# Patient Record
Sex: MALE | Race: WHITE | Employment: OTHER | URBAN - METROPOLITAN AREA
[De-identification: names, ages, dates, MRNs, and addresses within clinical notes are randomized per-mention and may not be internally consistent; named-entity substitution may affect disease eponyms.]

---

## 2017-04-12 ENCOUNTER — APPOINTMENT (OUTPATIENT)
Dept: LAB | Facility: HOSPITAL | Age: 72
End: 2017-04-12
Attending: UROLOGY
Payer: MEDICARE

## 2017-04-12 DIAGNOSIS — C61 CANCER OF PROSTATE (HCC): ICD-10-CM

## 2017-04-12 PROCEDURE — 36415 COLL VENOUS BLD VENIPUNCTURE: CPT

## 2017-04-12 PROCEDURE — 84153 ASSAY OF PSA TOTAL: CPT

## 2017-05-25 PROBLEM — M25.512 ACUTE PAIN OF LEFT SHOULDER: Status: ACTIVE | Noted: 2017-05-25

## 2017-05-25 PROBLEM — M25.562 ACUTE PAIN OF LEFT KNEE: Status: ACTIVE | Noted: 2017-05-25

## 2017-06-13 ENCOUNTER — CHARTING TRANS (OUTPATIENT)
Dept: OTHER | Age: 72
End: 2017-06-13

## 2017-06-13 ENCOUNTER — LAB SERVICES (OUTPATIENT)
Dept: OTHER | Age: 72
End: 2017-06-13

## 2017-06-13 LAB — RAPID STREP GROUP A: NORMAL

## 2017-08-29 PROBLEM — E11.40 DIABETIC NEUROPATHY ASSOCIATED WITH TYPE 2 DIABETES MELLITUS (HCC): Status: ACTIVE | Noted: 2017-08-29

## 2017-08-29 PROBLEM — M25.512 LEFT SHOULDER PAIN, UNSPECIFIED CHRONICITY: Status: ACTIVE | Noted: 2017-08-29

## 2017-08-29 PROBLEM — C61 PROSTATE CA (HCC): Status: ACTIVE | Noted: 2017-08-29

## 2017-08-29 PROBLEM — M75.122 COMPLETE TEAR OF LEFT ROTATOR CUFF: Status: ACTIVE | Noted: 2017-08-29

## 2017-08-29 PROBLEM — R32 URINARY LEAKAGE: Status: ACTIVE | Noted: 2017-08-29

## 2017-08-29 PROBLEM — M17.11 PRIMARY OSTEOARTHRITIS OF RIGHT KNEE: Status: ACTIVE | Noted: 2017-08-29

## 2017-08-29 PROBLEM — E11.9 DIABETES TYPE 2, CONTROLLED (HCC): Status: ACTIVE | Noted: 2017-08-29

## 2017-08-29 PROBLEM — B39.9 HISTOPLASMOSIS: Status: ACTIVE | Noted: 2017-08-29

## 2017-09-27 PROBLEM — S83.231A COMPLEX TEAR OF MEDIAL MENISCUS OF RIGHT KNEE AS CURRENT INJURY, INITIAL ENCOUNTER: Status: ACTIVE | Noted: 2017-09-27

## 2017-10-19 PROBLEM — Z47.89 ORTHOPEDIC AFTERCARE: Status: ACTIVE | Noted: 2017-10-19

## 2018-04-17 PROBLEM — Z96.651 STATUS POST RIGHT KNEE REPLACEMENT: Status: ACTIVE | Noted: 2018-04-17

## 2018-04-17 PROBLEM — R25.2 MUSCLE CRAMPING: Status: ACTIVE | Noted: 2018-04-17

## 2018-05-08 ENCOUNTER — LAB ENCOUNTER (OUTPATIENT)
Dept: LAB | Facility: HOSPITAL | Age: 73
End: 2018-05-08
Attending: UROLOGY
Payer: MEDICARE

## 2018-05-08 DIAGNOSIS — N13.9 OBSTRUCTION, UROPATHY: Primary | ICD-10-CM

## 2018-05-08 DIAGNOSIS — Z12.5 SCREENING PSA (PROSTATE SPECIFIC ANTIGEN): ICD-10-CM

## 2018-05-08 PROCEDURE — 82565 ASSAY OF CREATININE: CPT

## 2018-05-08 PROCEDURE — 36415 COLL VENOUS BLD VENIPUNCTURE: CPT

## 2018-10-02 ENCOUNTER — HOSPITAL ENCOUNTER (OUTPATIENT)
Dept: INTERVENTIONAL RADIOLOGY/VASCULAR | Facility: HOSPITAL | Age: 73
Discharge: HOME | End: 2018-10-02
Attending: INTERNAL MEDICINE | Admitting: INTERNAL MEDICINE
Payer: MEDICARE

## 2018-10-02 VITALS
OXYGEN SATURATION: 94 % | BODY MASS INDEX: 28 KG/M2 | HEART RATE: 56 BPM | SYSTOLIC BLOOD PRESSURE: 115 MMHG | WEIGHT: 171 LBS | DIASTOLIC BLOOD PRESSURE: 67 MMHG | RESPIRATION RATE: 13 BRPM

## 2018-10-02 DIAGNOSIS — R94.39 ABNORMAL STRESS TEST: ICD-10-CM

## 2018-10-02 PROCEDURE — 99152 MOD SED SAME PHYS/QHP 5/>YRS: CPT

## 2018-10-02 PROCEDURE — B2111ZZ FLUOROSCOPY OF MULTIPLE CORONARY ARTERIES USING LOW OSMOLAR CONTRAST: ICD-10-PCS | Performed by: INTERNAL MEDICINE

## 2018-10-02 PROCEDURE — 80048 BASIC METABOLIC PNL TOTAL CA: CPT | Performed by: INTERNAL MEDICINE

## 2018-10-02 PROCEDURE — 85027 COMPLETE CBC AUTOMATED: CPT | Performed by: INTERNAL MEDICINE

## 2018-10-02 PROCEDURE — 82962 GLUCOSE BLOOD TEST: CPT

## 2018-10-02 PROCEDURE — 93458 L HRT ARTERY/VENTRICLE ANGIO: CPT

## 2018-10-02 PROCEDURE — 99153 MOD SED SAME PHYS/QHP EA: CPT

## 2018-10-02 PROCEDURE — 4A023N7 MEASUREMENT OF CARDIAC SAMPLING AND PRESSURE, LEFT HEART, PERCUTANEOUS APPROACH: ICD-10-PCS | Performed by: INTERNAL MEDICINE

## 2018-10-02 RX ORDER — NITROGLYCERIN 20 MG/100ML
INJECTION INTRAVENOUS
Status: DISCONTINUED
Start: 2018-10-02 | End: 2018-10-02 | Stop reason: WASHOUT

## 2018-10-02 RX ORDER — VERAPAMIL HYDROCHLORIDE 2.5 MG/ML
INJECTION, SOLUTION INTRAVENOUS
Status: COMPLETED
Start: 2018-10-02 | End: 2018-10-02

## 2018-10-02 RX ORDER — MIDAZOLAM HYDROCHLORIDE 1 MG/ML
INJECTION INTRAMUSCULAR; INTRAVENOUS
Status: COMPLETED
Start: 2018-10-02 | End: 2018-10-02

## 2018-10-02 RX ORDER — SODIUM CHLORIDE 9 MG/ML
INJECTION, SOLUTION INTRAVENOUS
Status: DISCONTINUED
Start: 2018-10-02 | End: 2018-10-02

## 2018-10-02 RX ORDER — SODIUM CHLORIDE 9 MG/ML
INJECTION, SOLUTION INTRAVENOUS CONTINUOUS
Status: DISCONTINUED | OUTPATIENT
Start: 2018-10-02 | End: 2018-10-02

## 2018-10-02 RX ORDER — HEPARIN SODIUM 1000 [USP'U]/ML
INJECTION, SOLUTION INTRAVENOUS; SUBCUTANEOUS
Status: COMPLETED
Start: 2018-10-02 | End: 2018-10-02

## 2018-10-02 RX ORDER — LIDOCAINE HYDROCHLORIDE 20 MG/ML
INJECTION, SOLUTION EPIDURAL; INFILTRATION; INTRACAUDAL; PERINEURAL
Status: COMPLETED
Start: 2018-10-02 | End: 2018-10-02

## 2018-10-02 NOTE — BRIEF PROCEDURE NOTE
Brief Cardiac Cath Post-op Report    LM angiographically normal  LAD mild luminal irregularities  LCx large and dominant, angiographically normal  RCA small artery, nondominant, diffuse disease in the proximal segment    Plan:  Radial band deflation per pr

## 2018-11-03 VITALS
TEMPERATURE: 98.5 F | OXYGEN SATURATION: 98 % | HEART RATE: 76 BPM | BODY MASS INDEX: 29.23 KG/M2 | RESPIRATION RATE: 16 BRPM | WEIGHT: 181.88 LBS | HEIGHT: 66 IN

## 2019-05-10 ENCOUNTER — LAB ENCOUNTER (OUTPATIENT)
Dept: LAB | Facility: HOSPITAL | Age: 74
End: 2019-05-10
Attending: UROLOGY
Payer: MEDICARE

## 2019-05-10 ENCOUNTER — HOSPITAL ENCOUNTER (OUTPATIENT)
Dept: ULTRASOUND IMAGING | Facility: HOSPITAL | Age: 74
Discharge: HOME | End: 2019-05-10
Attending: UROLOGY
Payer: MEDICARE

## 2019-05-10 DIAGNOSIS — N13.9 ACUTE UNILATERAL OBSTRUCTIVE UROPATHY: ICD-10-CM

## 2019-05-10 DIAGNOSIS — N13.9 OBSTRUCTIVE UROPATHY: ICD-10-CM

## 2019-05-10 DIAGNOSIS — Z12.5 SPECIAL SCREENING FOR MALIGNANT NEOPLASM OF PROSTATE: Primary | ICD-10-CM

## 2019-05-10 PROCEDURE — 84153 ASSAY OF PSA TOTAL: CPT

## 2019-05-10 PROCEDURE — 36415 COLL VENOUS BLD VENIPUNCTURE: CPT

## 2019-05-10 PROCEDURE — 76857 US EXAM PELVIC LIMITED: CPT | Performed by: UROLOGY

## 2019-05-10 PROCEDURE — 82565 ASSAY OF CREATININE: CPT

## 2020-04-29 ENCOUNTER — APPOINTMENT (OUTPATIENT)
Dept: LAB | Facility: HOSPITAL | Age: 75
End: 2020-04-29
Attending: UROLOGY
Payer: MEDICARE

## 2020-04-29 DIAGNOSIS — N13.9 OBSTRUCTED, UROPATHY: ICD-10-CM

## 2020-04-29 DIAGNOSIS — C61 CANCER OF PROSTATE (HCC): ICD-10-CM

## 2020-04-29 PROCEDURE — 36415 COLL VENOUS BLD VENIPUNCTURE: CPT

## 2020-04-29 PROCEDURE — 82565 ASSAY OF CREATININE: CPT

## 2020-04-29 PROCEDURE — 84153 ASSAY OF PSA TOTAL: CPT

## 2021-06-07 ENCOUNTER — LAB ENCOUNTER (OUTPATIENT)
Dept: LAB | Facility: HOSPITAL | Age: 76
End: 2021-06-07
Attending: UROLOGY
Payer: MEDICARE

## 2021-06-07 DIAGNOSIS — C61 PROSTATE CANCER (HCC): ICD-10-CM

## 2021-06-07 DIAGNOSIS — N13.9 OBSTRUCTIVE UROPATHY: ICD-10-CM

## 2021-06-07 PROCEDURE — 36415 COLL VENOUS BLD VENIPUNCTURE: CPT

## 2021-06-07 PROCEDURE — 82565 ASSAY OF CREATININE: CPT

## 2021-06-07 PROCEDURE — 84153 ASSAY OF PSA TOTAL: CPT

## 2021-07-23 ENCOUNTER — TELEPHONE (OUTPATIENT)
Dept: NEUROLOGY | Facility: CLINIC | Age: 76
End: 2021-07-23

## 2021-12-20 PROBLEM — M12.811 RIGHT ROTATOR CUFF TEAR ARTHROPATHY: Status: ACTIVE | Noted: 2021-12-20

## 2021-12-20 PROBLEM — M75.101 RIGHT ROTATOR CUFF TEAR ARTHROPATHY: Status: ACTIVE | Noted: 2021-12-20

## 2022-09-15 ENCOUNTER — HOSPITAL ENCOUNTER (OUTPATIENT)
Dept: GENERAL RADIOLOGY | Facility: HOSPITAL | Age: 77
Discharge: HOME | End: 2022-09-15
Attending: ORTHOPAEDIC SURGERY
Payer: MEDICARE

## 2022-09-15 DIAGNOSIS — M25.512 LEFT SHOULDER PAIN, UNSPECIFIED CHRONICITY: ICD-10-CM

## 2022-09-15 PROCEDURE — 73030 X-RAY EXAM OF SHOULDER: CPT | Performed by: ORTHOPAEDIC SURGERY

## 2022-09-29 ENCOUNTER — LAB ENCOUNTER (OUTPATIENT)
Dept: LAB | Facility: HOSPITAL | Age: 77
End: 2022-09-29
Attending: PHYSICIAN ASSISTANT
Payer: MEDICARE

## 2022-09-29 ENCOUNTER — HOSPITAL ENCOUNTER (OUTPATIENT)
Dept: CT IMAGING | Facility: HOSPITAL | Age: 77
Discharge: HOME | End: 2022-09-29
Attending: PHYSICIAN ASSISTANT
Payer: MEDICARE

## 2022-09-29 DIAGNOSIS — G89.29 CHRONIC LEFT SHOULDER PAIN: Primary | ICD-10-CM

## 2022-09-29 DIAGNOSIS — M25.512 CHRONIC LEFT SHOULDER PAIN: Primary | ICD-10-CM

## 2022-09-29 DIAGNOSIS — M25.512 CHRONIC LEFT SHOULDER PAIN: ICD-10-CM

## 2022-09-29 DIAGNOSIS — G89.29 CHRONIC LEFT SHOULDER PAIN: ICD-10-CM

## 2022-09-29 LAB
BASOPHILS # BLD AUTO: 0.03 X10(3) UL (ref 0–0.2)
BASOPHILS NFR BLD AUTO: 0.6 %
CRP SERPL-MCNC: <0.29 MG/DL (ref ?–0.3)
DEPRECATED RDW RBC AUTO: 45.7 FL (ref 35.1–46.3)
EOSINOPHIL # BLD AUTO: 0.25 X10(3) UL (ref 0–0.7)
EOSINOPHIL NFR BLD AUTO: 4.6 %
ERYTHROCYTE [DISTWIDTH] IN BLOOD BY AUTOMATED COUNT: 13.6 % (ref 11–15)
ERYTHROCYTE [SEDIMENTATION RATE] IN BLOOD: 16 MM/HR
HCT VFR BLD AUTO: 47.8 %
HGB BLD-MCNC: 15.2 G/DL
IL6 SERPL-MCNC: <3 PG/ML (ref ?–4.4)
IMM GRANULOCYTES # BLD AUTO: 0.01 X10(3) UL (ref 0–1)
IMM GRANULOCYTES NFR BLD: 0.2 %
LYMPHOCYTES # BLD AUTO: 1.09 X10(3) UL (ref 1–4)
LYMPHOCYTES NFR BLD AUTO: 20.2 %
MCH RBC QN AUTO: 28.8 PG (ref 26–34)
MCHC RBC AUTO-ENTMCNC: 31.8 G/DL (ref 31–37)
MCV RBC AUTO: 90.5 FL
MONOCYTES # BLD AUTO: 0.43 X10(3) UL (ref 0.1–1)
MONOCYTES NFR BLD AUTO: 8 %
NEUTROPHILS # BLD AUTO: 3.59 X10 (3) UL (ref 1.5–7.7)
NEUTROPHILS # BLD AUTO: 3.59 X10(3) UL (ref 1.5–7.7)
NEUTROPHILS NFR BLD AUTO: 66.4 %
PLATELET # BLD AUTO: 199 10(3)UL (ref 150–450)
RBC # BLD AUTO: 5.28 X10(6)UL
WBC # BLD AUTO: 5.4 X10(3) UL (ref 4–11)

## 2022-09-29 PROCEDURE — 83529 ASAY OF INTERLEUKIN-6 (IL-6): CPT

## 2022-09-29 PROCEDURE — 73200 CT UPPER EXTREMITY W/O DYE: CPT | Performed by: PHYSICIAN ASSISTANT

## 2022-09-29 PROCEDURE — 86140 C-REACTIVE PROTEIN: CPT

## 2022-09-29 PROCEDURE — 85652 RBC SED RATE AUTOMATED: CPT

## 2022-09-29 PROCEDURE — 85025 COMPLETE CBC W/AUTO DIFF WBC: CPT

## 2022-09-29 PROCEDURE — 36415 COLL VENOUS BLD VENIPUNCTURE: CPT

## 2022-11-16 ENCOUNTER — HOSPITAL ENCOUNTER (OUTPATIENT)
Dept: GENERAL RADIOLOGY | Age: 77
Discharge: HOME | End: 2022-11-16
Attending: INTERNAL MEDICINE

## 2022-11-16 DIAGNOSIS — M81.0 OSTEOPOROSIS: ICD-10-CM

## 2022-11-16 PROCEDURE — 77080 DXA BONE DENSITY AXIAL: CPT

## 2023-01-05 NOTE — H&P
[facility]    PATIENT'S NAME: Goyo Crews   ATTENDING PHYSICIAN: Nilesh Butt MD   PATIENT ACCOUNT#:   [de-identified]    LOCATION:    MEDICAL RECORD #:   [id number]       YOB: 1945  ADMISSION DATE:       01/11/2023    HISTORY AND PHYSICAL EXAMINATION    HISTORY OF PRESENT ILLNESS:  The patient is a 74-year-old gentleman with left shoulder pain following a reverse total shoulder replacement by Dr. Elvia Prado in 2019. He had complaints of persistent pain and weakness. We ultimately did lab work, which was normal, to rule out low-grade infection and we did a CT scan to evaluate for component loosening. Review of the CT scan demonstrates inferior scapular notching, implant is well-fixed. Review of the operative note shows it is a [employer] system reverse. PAST MEDICAL HISTORY:  Significant for coronary artery disease, hypertension, histoplasmosis, sleep apnea, diabetes. PAST SURGICAL HISTORY:  Significant for right knee replacement, hernia repair, bilateral rotator cuff repairs, left reverse total shoulder replacement. MEDICATIONS:  Alfuzosin, gabapentin, isosorbide, valsartan, metformin, rosuvastatin, oxybutynin, nitroglycerin, atenolol, and a low-dose aspirin. ALLERGIES:  Amoxicillin, atorvastatin, and cholera vaccine. PHYSICAL EXAMINATION:    VITAL SIGNS:  He is 5 feet 6 inches, 170 pounds. EXTREMITIES:  Physical examination demonstrates a well-healed deltopectoral incision. His active elevation is to 110 with external rotation to 30, internal rotation to gluteus. No tenderness over the spine of his scapula. No anterior or posterior joint line tenderness. CT scan does demonstrate inferior scapular notching, but component appears well-fixed.     LABORATORY DATA:  Laboratory values are normal.    ASSESSMENT AND PLAN:  The patient is a 74-year-old gentleman with a painful left reverse total shoulder arthroplasty with CT scan that demonstrates inferior scapular notching. We discussed proceeding with revision of his reverse total shoulder arthroplasty to a lateralized component. Discussed all the risks and complications associated with this type of procedure and he would like to proceed.       Layla Draper PA-C, dictating for Jessica Simpson MD    Dictated By Jessica Simpson MD  d: 01/05/2023 07:01:40  t: 01/05/2023 07:17:59  [facility] [id number]  JACKSON/

## 2023-01-08 ENCOUNTER — LAB ENCOUNTER (OUTPATIENT)
Dept: LAB | Facility: HOSPITAL | Age: 78
End: 2023-01-08
Attending: ORTHOPAEDIC SURGERY
Payer: MEDICARE

## 2023-01-08 DIAGNOSIS — Z01.818 PREOP TESTING: ICD-10-CM

## 2023-01-08 LAB
MRSA DNA SPEC QL NAA+PROBE: NEGATIVE
SARS-COV-2 RNA RESP QL NAA+PROBE: NOT DETECTED

## 2023-01-08 PROCEDURE — 87641 MR-STAPH DNA AMP PROBE: CPT

## 2023-01-11 ENCOUNTER — HOSPITAL ENCOUNTER (OUTPATIENT)
Facility: HOSPITAL | Age: 78
Discharge: HOME | End: 2023-01-12
Attending: ORTHOPAEDIC SURGERY | Admitting: ORTHOPAEDIC SURGERY
Payer: MEDICARE

## 2023-01-11 ENCOUNTER — ANESTHESIA EVENT (OUTPATIENT)
Dept: SURGERY | Facility: HOSPITAL | Age: 78
End: 2023-01-11
Payer: MEDICARE

## 2023-01-11 ENCOUNTER — APPOINTMENT (OUTPATIENT)
Dept: GENERAL RADIOLOGY | Facility: HOSPITAL | Age: 78
End: 2023-01-11
Attending: ORTHOPAEDIC SURGERY
Payer: MEDICARE

## 2023-01-11 ENCOUNTER — ANESTHESIA (OUTPATIENT)
Dept: SURGERY | Facility: HOSPITAL | Age: 78
End: 2023-01-11
Payer: MEDICARE

## 2023-01-11 DIAGNOSIS — Z01.818 PREOP TESTING: Primary | ICD-10-CM

## 2023-01-11 PROBLEM — T84.028A: Status: ACTIVE | Noted: 2023-01-11

## 2023-01-11 PROBLEM — N40.0 BPH (BENIGN PROSTATIC HYPERPLASIA): Chronic | Status: ACTIVE | Noted: 2023-01-11

## 2023-01-11 PROBLEM — Z96.612: Status: ACTIVE | Noted: 2023-01-11

## 2023-01-11 PROBLEM — E11.9 DIABETES MELLITUS, TYPE 2 (HCC): Status: ACTIVE | Noted: 2017-08-29

## 2023-01-11 PROBLEM — I25.10 CAD (CORONARY ARTERY DISEASE): Chronic | Status: ACTIVE | Noted: 2023-01-11

## 2023-01-11 PROBLEM — T84.028A INSTABILITY OF REVERSE TOTAL LEFT SHOULDER ARTHROPLASTY (HCC): Status: ACTIVE | Noted: 2023-01-11

## 2023-01-11 PROBLEM — Z96.612 INSTABILITY OF REVERSE TOTAL LEFT SHOULDER ARTHROPLASTY (HCC): Status: ACTIVE | Noted: 2023-01-11

## 2023-01-11 PROBLEM — E78.5 HYPERLIPIDEMIA: Chronic | Status: ACTIVE | Noted: 2023-01-11

## 2023-01-11 PROBLEM — I10 ESSENTIAL HYPERTENSION: Chronic | Status: ACTIVE | Noted: 2023-01-11

## 2023-01-11 PROBLEM — G47.33 OSA (OBSTRUCTIVE SLEEP APNEA): Chronic | Status: ACTIVE | Noted: 2023-01-11

## 2023-01-11 LAB
GLUCOSE BLDC GLUCOMTR-MCNC: 103 MG/DL (ref 70–99)
GLUCOSE BLDC GLUCOMTR-MCNC: 138 MG/DL (ref 70–99)
GLUCOSE BLDC GLUCOMTR-MCNC: 158 MG/DL (ref 70–99)
GLUCOSE BLDC GLUCOMTR-MCNC: 287 MG/DL (ref 70–99)

## 2023-01-11 PROCEDURE — 0RRK00Z REPLACEMENT OF LEFT SHOULDER JOINT WITH REVERSE BALL AND SOCKET SYNTHETIC SUBSTITUTE, OPEN APPROACH: ICD-10-PCS | Performed by: ORTHOPAEDIC SURGERY

## 2023-01-11 PROCEDURE — 87070 CULTURE OTHR SPECIMN AEROBIC: CPT | Performed by: ORTHOPAEDIC SURGERY

## 2023-01-11 PROCEDURE — 73020 X-RAY EXAM OF SHOULDER: CPT | Performed by: ORTHOPAEDIC SURGERY

## 2023-01-11 PROCEDURE — 82962 GLUCOSE BLOOD TEST: CPT

## 2023-01-11 PROCEDURE — 0RPK0JZ REMOVAL OF SYNTHETIC SUBSTITUTE FROM LEFT SHOULDER JOINT, OPEN APPROACH: ICD-10-PCS | Performed by: ORTHOPAEDIC SURGERY

## 2023-01-11 PROCEDURE — 88300 SURGICAL PATH GROSS: CPT | Performed by: ORTHOPAEDIC SURGERY

## 2023-01-11 PROCEDURE — S0077 INJECTION, CLINDAMYCIN PHOSP: HCPCS | Performed by: STUDENT IN AN ORGANIZED HEALTH CARE EDUCATION/TRAINING PROGRAM

## 2023-01-11 PROCEDURE — 87205 SMEAR GRAM STAIN: CPT | Performed by: ORTHOPAEDIC SURGERY

## 2023-01-11 PROCEDURE — 87075 CULTR BACTERIA EXCEPT BLOOD: CPT | Performed by: ORTHOPAEDIC SURGERY

## 2023-01-11 PROCEDURE — 64415 NJX AA&/STRD BRCH PLXS IMG: CPT | Performed by: ORTHOPAEDIC SURGERY

## 2023-01-11 PROCEDURE — 87176 TISSUE HOMOGENIZATION CULTR: CPT | Performed by: ORTHOPAEDIC SURGERY

## 2023-01-11 DEVICE — IMPLANTABLE DEVICE
Type: IMPLANTABLE DEVICE | Site: SHOULDER | Status: FUNCTIONAL
Brand: COMPREHENSIVE® PROLONG®

## 2023-01-11 DEVICE — IMPLANTABLE DEVICE
Type: IMPLANTABLE DEVICE | Site: SHOULDER | Status: FUNCTIONAL
Brand: COMPREHENSIVE SHOULDER SYSTEM

## 2023-01-11 DEVICE — IMPLANTABLE DEVICE
Type: IMPLANTABLE DEVICE | Site: SHOULDER | Status: FUNCTIONAL
Brand: COMPREHENSIVE® VERSA-DIAL®

## 2023-01-11 RX ORDER — LOSARTAN POTASSIUM 100 MG/1
100 TABLET ORAL
Status: DISCONTINUED | OUTPATIENT
Start: 2023-01-11 | End: 2023-01-12

## 2023-01-11 RX ORDER — METOCLOPRAMIDE HYDROCHLORIDE 5 MG/ML
10 INJECTION INTRAMUSCULAR; INTRAVENOUS EVERY 8 HOURS PRN
Status: DISCONTINUED | OUTPATIENT
Start: 2023-01-11 | End: 2023-01-12

## 2023-01-11 RX ORDER — GABAPENTIN 300 MG/1
900 CAPSULE ORAL NIGHTLY
Status: DISCONTINUED | OUTPATIENT
Start: 2023-01-11 | End: 2023-01-11

## 2023-01-11 RX ORDER — MORPHINE SULFATE 4 MG/ML
4 INJECTION, SOLUTION INTRAMUSCULAR; INTRAVENOUS EVERY 10 MIN PRN
Status: DISCONTINUED | OUTPATIENT
Start: 2023-01-11 | End: 2023-01-11 | Stop reason: HOSPADM

## 2023-01-11 RX ORDER — HYDROMORPHONE HYDROCHLORIDE 1 MG/ML
0.4 INJECTION, SOLUTION INTRAMUSCULAR; INTRAVENOUS; SUBCUTANEOUS EVERY 5 MIN PRN
Status: DISCONTINUED | OUTPATIENT
Start: 2023-01-11 | End: 2023-01-11 | Stop reason: HOSPADM

## 2023-01-11 RX ORDER — MORPHINE SULFATE 10 MG/ML
6 INJECTION, SOLUTION INTRAMUSCULAR; INTRAVENOUS EVERY 10 MIN PRN
Status: DISCONTINUED | OUTPATIENT
Start: 2023-01-11 | End: 2023-01-11 | Stop reason: HOSPADM

## 2023-01-11 RX ORDER — ONDANSETRON 2 MG/ML
4 INJECTION INTRAMUSCULAR; INTRAVENOUS EVERY 6 HOURS PRN
Status: DISCONTINUED | OUTPATIENT
Start: 2023-01-11 | End: 2023-01-12

## 2023-01-11 RX ORDER — MORPHINE SULFATE 4 MG/ML
4 INJECTION, SOLUTION INTRAMUSCULAR; INTRAVENOUS EVERY 2 HOUR PRN
Status: DISCONTINUED | OUTPATIENT
Start: 2023-01-11 | End: 2023-01-12

## 2023-01-11 RX ORDER — ISOSORBIDE MONONITRATE 60 MG/1
60 TABLET, EXTENDED RELEASE ORAL DAILY
Status: DISCONTINUED | OUTPATIENT
Start: 2023-01-12 | End: 2023-01-12

## 2023-01-11 RX ORDER — MAGNESIUM HYDROXIDE 1200 MG/15ML
LIQUID ORAL CONTINUOUS PRN
Status: COMPLETED | OUTPATIENT
Start: 2023-01-11 | End: 2023-01-11

## 2023-01-11 RX ORDER — GLYCOPYRROLATE 0.2 MG/ML
INJECTION, SOLUTION INTRAMUSCULAR; INTRAVENOUS AS NEEDED
Status: DISCONTINUED | OUTPATIENT
Start: 2023-01-11 | End: 2023-01-11 | Stop reason: SURG

## 2023-01-11 RX ORDER — NICOTINE POLACRILEX 4 MG
30 LOZENGE BUCCAL
Status: DISCONTINUED | OUTPATIENT
Start: 2023-01-11 | End: 2023-01-11 | Stop reason: HOSPADM

## 2023-01-11 RX ORDER — NICOTINE POLACRILEX 4 MG
15 LOZENGE BUCCAL
Status: DISCONTINUED | OUTPATIENT
Start: 2023-01-11 | End: 2023-01-11 | Stop reason: HOSPADM

## 2023-01-11 RX ORDER — CLINDAMYCIN PHOSPHATE 900 MG/50ML
900 INJECTION INTRAVENOUS EVERY 8 HOURS
Status: COMPLETED | OUTPATIENT
Start: 2023-01-11 | End: 2023-01-12

## 2023-01-11 RX ORDER — ASPIRIN 81 MG/1
81 TABLET ORAL DAILY
Status: DISCONTINUED | OUTPATIENT
Start: 2023-01-11 | End: 2023-01-12

## 2023-01-11 RX ORDER — HYDROMORPHONE HYDROCHLORIDE 1 MG/ML
0.2 INJECTION, SOLUTION INTRAMUSCULAR; INTRAVENOUS; SUBCUTANEOUS EVERY 5 MIN PRN
Status: DISCONTINUED | OUTPATIENT
Start: 2023-01-11 | End: 2023-01-11 | Stop reason: HOSPADM

## 2023-01-11 RX ORDER — CLINDAMYCIN PHOSPHATE 900 MG/50ML
900 INJECTION INTRAVENOUS ONCE
Status: DISCONTINUED | OUTPATIENT
Start: 2023-01-11 | End: 2023-01-11 | Stop reason: HOSPADM

## 2023-01-11 RX ORDER — MORPHINE SULFATE 2 MG/ML
2 INJECTION, SOLUTION INTRAMUSCULAR; INTRAVENOUS EVERY 2 HOUR PRN
Status: DISCONTINUED | OUTPATIENT
Start: 2023-01-11 | End: 2023-01-12

## 2023-01-11 RX ORDER — ACETAMINOPHEN 500 MG
1000 TABLET ORAL EVERY 8 HOURS
Status: COMPLETED | OUTPATIENT
Start: 2023-01-11 | End: 2023-01-12

## 2023-01-11 RX ORDER — SODIUM PHOSPHATE, DIBASIC AND SODIUM PHOSPHATE, MONOBASIC 7; 19 G/133ML; G/133ML
1 ENEMA RECTAL ONCE AS NEEDED
Status: DISCONTINUED | OUTPATIENT
Start: 2023-01-11 | End: 2023-01-12

## 2023-01-11 RX ORDER — DEXTROSE MONOHYDRATE 25 G/50ML
50 INJECTION, SOLUTION INTRAVENOUS
Status: DISCONTINUED | OUTPATIENT
Start: 2023-01-11 | End: 2023-01-11 | Stop reason: HOSPADM

## 2023-01-11 RX ORDER — GABAPENTIN 600 MG/1
1200 TABLET ORAL
Status: DISCONTINUED | OUTPATIENT
Start: 2023-01-11 | End: 2023-01-11

## 2023-01-11 RX ORDER — HYDROCODONE BITARTRATE AND ACETAMINOPHEN 10; 325 MG/1; MG/1
2 TABLET ORAL EVERY 6 HOURS PRN
Status: DISCONTINUED | OUTPATIENT
Start: 2023-01-11 | End: 2023-01-12

## 2023-01-11 RX ORDER — LOSARTAN POTASSIUM 100 MG/1
100 TABLET ORAL DAILY
Status: DISCONTINUED | OUTPATIENT
Start: 2023-01-12 | End: 2023-01-11

## 2023-01-11 RX ORDER — ROSUVASTATIN CALCIUM 10 MG/1
20 TABLET, COATED ORAL NIGHTLY
Status: DISCONTINUED | OUTPATIENT
Start: 2023-01-11 | End: 2023-01-12

## 2023-01-11 RX ORDER — HYDROMORPHONE HYDROCHLORIDE 1 MG/ML
0.6 INJECTION, SOLUTION INTRAMUSCULAR; INTRAVENOUS; SUBCUTANEOUS EVERY 5 MIN PRN
Status: DISCONTINUED | OUTPATIENT
Start: 2023-01-11 | End: 2023-01-11 | Stop reason: HOSPADM

## 2023-01-11 RX ORDER — OXYCODONE HYDROCHLORIDE 5 MG/1
5 TABLET ORAL EVERY 4 HOURS PRN
Status: DISCONTINUED | OUTPATIENT
Start: 2023-01-11 | End: 2023-01-12

## 2023-01-11 RX ORDER — GABAPENTIN 600 MG/1
900 TABLET ORAL
Status: DISCONTINUED | OUTPATIENT
Start: 2023-01-12 | End: 2023-01-11 | Stop reason: ALTCHOICE

## 2023-01-11 RX ORDER — ATENOLOL 25 MG/1
25 TABLET ORAL
Status: DISCONTINUED | OUTPATIENT
Start: 2023-01-12 | End: 2023-01-11

## 2023-01-11 RX ORDER — SODIUM CHLORIDE, SODIUM LACTATE, POTASSIUM CHLORIDE, CALCIUM CHLORIDE 600; 310; 30; 20 MG/100ML; MG/100ML; MG/100ML; MG/100ML
INJECTION, SOLUTION INTRAVENOUS CONTINUOUS
Status: DISCONTINUED | OUTPATIENT
Start: 2023-01-11 | End: 2023-01-11 | Stop reason: HOSPADM

## 2023-01-11 RX ORDER — GABAPENTIN 600 MG/1
600 TABLET ORAL 2 TIMES DAILY
Status: DISCONTINUED | OUTPATIENT
Start: 2023-01-12 | End: 2023-01-12

## 2023-01-11 RX ORDER — OXYBUTYNIN CHLORIDE 5 MG/1
10 TABLET, EXTENDED RELEASE ORAL DAILY
Status: DISCONTINUED | OUTPATIENT
Start: 2023-01-12 | End: 2023-01-12

## 2023-01-11 RX ORDER — LIDOCAINE HYDROCHLORIDE 10 MG/ML
INJECTION, SOLUTION EPIDURAL; INFILTRATION; INTRACAUDAL; PERINEURAL AS NEEDED
Status: DISCONTINUED | OUTPATIENT
Start: 2023-01-11 | End: 2023-01-11 | Stop reason: SURG

## 2023-01-11 RX ORDER — MORPHINE SULFATE 4 MG/ML
6 INJECTION, SOLUTION INTRAMUSCULAR; INTRAVENOUS EVERY 2 HOUR PRN
Status: DISCONTINUED | OUTPATIENT
Start: 2023-01-11 | End: 2023-01-12

## 2023-01-11 RX ORDER — DOCUSATE SODIUM 100 MG/1
100 CAPSULE, LIQUID FILLED ORAL 2 TIMES DAILY
Status: DISCONTINUED | OUTPATIENT
Start: 2023-01-11 | End: 2023-01-12

## 2023-01-11 RX ORDER — SENNOSIDES 8.6 MG
17.2 TABLET ORAL NIGHTLY
Status: DISCONTINUED | OUTPATIENT
Start: 2023-01-11 | End: 2023-01-12

## 2023-01-11 RX ORDER — MORPHINE SULFATE 4 MG/ML
2 INJECTION, SOLUTION INTRAMUSCULAR; INTRAVENOUS EVERY 10 MIN PRN
Status: DISCONTINUED | OUTPATIENT
Start: 2023-01-11 | End: 2023-01-11 | Stop reason: HOSPADM

## 2023-01-11 RX ORDER — MORPHINE SULFATE 15 MG/1
15 TABLET ORAL EVERY 4 HOURS PRN
Status: DISCONTINUED | OUTPATIENT
Start: 2023-01-11 | End: 2023-01-12

## 2023-01-11 RX ORDER — DIPHENHYDRAMINE HYDROCHLORIDE 50 MG/ML
25 INJECTION INTRAMUSCULAR; INTRAVENOUS ONCE AS NEEDED
Status: ACTIVE | OUTPATIENT
Start: 2023-01-11 | End: 2023-01-11

## 2023-01-11 RX ORDER — NALOXONE HYDROCHLORIDE 0.4 MG/ML
80 INJECTION, SOLUTION INTRAMUSCULAR; INTRAVENOUS; SUBCUTANEOUS AS NEEDED
Status: DISCONTINUED | OUTPATIENT
Start: 2023-01-11 | End: 2023-01-11 | Stop reason: HOSPADM

## 2023-01-11 RX ORDER — MIDAZOLAM HYDROCHLORIDE 1 MG/ML
INJECTION INTRAMUSCULAR; INTRAVENOUS
Status: COMPLETED | OUTPATIENT
Start: 2023-01-11 | End: 2023-01-11

## 2023-01-11 RX ORDER — EPHEDRINE SULFATE 50 MG/ML
INJECTION, SOLUTION INTRAVENOUS AS NEEDED
Status: DISCONTINUED | OUTPATIENT
Start: 2023-01-11 | End: 2023-01-11 | Stop reason: SURG

## 2023-01-11 RX ORDER — NITROGLYCERIN 0.4 MG/1
0.4 TABLET SUBLINGUAL EVERY 5 MIN PRN
Status: DISCONTINUED | OUTPATIENT
Start: 2023-01-11 | End: 2023-01-12

## 2023-01-11 RX ORDER — DEXAMETHASONE SODIUM PHOSPHATE 4 MG/ML
VIAL (ML) INJECTION AS NEEDED
Status: DISCONTINUED | OUTPATIENT
Start: 2023-01-11 | End: 2023-01-11 | Stop reason: SURG

## 2023-01-11 RX ORDER — HYDROMORPHONE HYDROCHLORIDE 2 MG/1
2 TABLET ORAL EVERY 2 HOUR PRN
Status: DISCONTINUED | OUTPATIENT
Start: 2023-01-11 | End: 2023-01-12

## 2023-01-11 RX ORDER — LIDOCAINE HYDROCHLORIDE 10 MG/ML
INJECTION, SOLUTION INFILTRATION; PERINEURAL
Status: COMPLETED | OUTPATIENT
Start: 2023-01-11 | End: 2023-01-11

## 2023-01-11 RX ORDER — OXYCODONE HYDROCHLORIDE 5 MG/1
10 TABLET ORAL EVERY 4 HOURS PRN
Status: DISCONTINUED | OUTPATIENT
Start: 2023-01-11 | End: 2023-01-12

## 2023-01-11 RX ORDER — GABAPENTIN 400 MG/1
1200 CAPSULE ORAL NIGHTLY
Status: DISCONTINUED | OUTPATIENT
Start: 2023-01-11 | End: 2023-01-11

## 2023-01-11 RX ORDER — ROPIVACAINE HYDROCHLORIDE 5 MG/ML
INJECTION, SOLUTION EPIDURAL; INFILTRATION; PERINEURAL
Status: COMPLETED | OUTPATIENT
Start: 2023-01-11 | End: 2023-01-11

## 2023-01-11 RX ORDER — ACETAMINOPHEN 500 MG
1000 TABLET ORAL ONCE
Status: COMPLETED | OUTPATIENT
Start: 2023-01-11 | End: 2023-01-11

## 2023-01-11 RX ORDER — TRANEXAMIC ACID 10 MG/ML
INJECTION, SOLUTION INTRAVENOUS AS NEEDED
Status: DISCONTINUED | OUTPATIENT
Start: 2023-01-11 | End: 2023-01-11 | Stop reason: SURG

## 2023-01-11 RX ORDER — HYDROMORPHONE HYDROCHLORIDE 4 MG/1
4 TABLET ORAL EVERY 4 HOURS PRN
Status: DISCONTINUED | OUTPATIENT
Start: 2023-01-11 | End: 2023-01-12

## 2023-01-11 RX ORDER — HYDROCODONE BITARTRATE AND ACETAMINOPHEN 10; 325 MG/1; MG/1
1 TABLET ORAL EVERY 6 HOURS PRN
Status: DISCONTINUED | OUTPATIENT
Start: 2023-01-11 | End: 2023-01-12

## 2023-01-11 RX ORDER — SODIUM CHLORIDE, SODIUM LACTATE, POTASSIUM CHLORIDE, CALCIUM CHLORIDE 600; 310; 30; 20 MG/100ML; MG/100ML; MG/100ML; MG/100ML
INJECTION, SOLUTION INTRAVENOUS CONTINUOUS
Status: DISCONTINUED | OUTPATIENT
Start: 2023-01-11 | End: 2023-01-12

## 2023-01-11 RX ORDER — ONDANSETRON 2 MG/ML
INJECTION INTRAMUSCULAR; INTRAVENOUS AS NEEDED
Status: DISCONTINUED | OUTPATIENT
Start: 2023-01-11 | End: 2023-01-11 | Stop reason: SURG

## 2023-01-11 RX ORDER — PHENYLEPHRINE HCL 10 MG/ML
VIAL (ML) INJECTION AS NEEDED
Status: DISCONTINUED | OUTPATIENT
Start: 2023-01-11 | End: 2023-01-11 | Stop reason: SURG

## 2023-01-11 RX ORDER — CLINDAMYCIN PHOSPHATE 150 MG/ML
INJECTION, SOLUTION INTRAVENOUS AS NEEDED
Status: DISCONTINUED | OUTPATIENT
Start: 2023-01-11 | End: 2023-01-11 | Stop reason: SURG

## 2023-01-11 RX ORDER — DEXAMETHASONE SODIUM PHOSPHATE 10 MG/ML
INJECTION, SOLUTION INTRAMUSCULAR; INTRAVENOUS
Status: COMPLETED | OUTPATIENT
Start: 2023-01-11 | End: 2023-01-11

## 2023-01-11 RX ORDER — LIDOCAINE HYDROCHLORIDE 40 MG/ML
SOLUTION TOPICAL AS NEEDED
Status: DISCONTINUED | OUTPATIENT
Start: 2023-01-11 | End: 2023-01-11 | Stop reason: SURG

## 2023-01-11 RX ORDER — BISACODYL 10 MG
10 SUPPOSITORY, RECTAL RECTAL
Status: DISCONTINUED | OUTPATIENT
Start: 2023-01-11 | End: 2023-01-12

## 2023-01-11 RX ORDER — TAMSULOSIN HYDROCHLORIDE 0.4 MG/1
0.4 CAPSULE ORAL NIGHTLY
Status: DISCONTINUED | OUTPATIENT
Start: 2023-01-11 | End: 2023-01-12

## 2023-01-11 RX ORDER — GABAPENTIN 300 MG/1
900 CAPSULE ORAL 2 TIMES DAILY
Status: DISCONTINUED | OUTPATIENT
Start: 2023-01-12 | End: 2023-01-11

## 2023-01-11 RX ORDER — ATENOLOL 25 MG/1
25 TABLET ORAL NIGHTLY
Status: DISCONTINUED | OUTPATIENT
Start: 2023-01-11 | End: 2023-01-12

## 2023-01-11 RX ORDER — POLYETHYLENE GLYCOL 3350 17 G/17G
17 POWDER, FOR SOLUTION ORAL DAILY PRN
Status: DISCONTINUED | OUTPATIENT
Start: 2023-01-11 | End: 2023-01-12

## 2023-01-11 RX ORDER — GABAPENTIN 300 MG/1
900 CAPSULE ORAL NIGHTLY
Status: DISCONTINUED | OUTPATIENT
Start: 2023-01-11 | End: 2023-01-12

## 2023-01-11 RX ADMIN — ROPIVACAINE HYDROCHLORIDE 30 ML: 5 INJECTION, SOLUTION EPIDURAL; INFILTRATION; PERINEURAL at 11:40:00

## 2023-01-11 RX ADMIN — PHENYLEPHRINE HCL 100 MCG: 10 MG/ML VIAL (ML) INJECTION at 12:41:00

## 2023-01-11 RX ADMIN — DEXAMETHASONE SODIUM PHOSPHATE 4 MG: 4 MG/ML VIAL (ML) INJECTION at 11:55:00

## 2023-01-11 RX ADMIN — PHENYLEPHRINE HCL 100 MCG: 10 MG/ML VIAL (ML) INJECTION at 12:16:00

## 2023-01-11 RX ADMIN — ONDANSETRON 4 MG: 2 INJECTION INTRAMUSCULAR; INTRAVENOUS at 11:55:00

## 2023-01-11 RX ADMIN — DEXAMETHASONE SODIUM PHOSPHATE 4 MG: 10 INJECTION, SOLUTION INTRAMUSCULAR; INTRAVENOUS at 11:40:00

## 2023-01-11 RX ADMIN — GLYCOPYRROLATE 0.2 MG: 0.2 INJECTION, SOLUTION INTRAMUSCULAR; INTRAVENOUS at 11:53:00

## 2023-01-11 RX ADMIN — MIDAZOLAM HYDROCHLORIDE 2 MG: 1 INJECTION INTRAMUSCULAR; INTRAVENOUS at 11:40:00

## 2023-01-11 RX ADMIN — LIDOCAINE HYDROCHLORIDE 5 ML: 10 INJECTION, SOLUTION INFILTRATION; PERINEURAL at 11:40:00

## 2023-01-11 RX ADMIN — LIDOCAINE HYDROCHLORIDE 4 ML: 40 SOLUTION TOPICAL at 11:57:00

## 2023-01-11 RX ADMIN — EPHEDRINE SULFATE 5 MG: 50 INJECTION, SOLUTION INTRAVENOUS at 12:18:00

## 2023-01-11 RX ADMIN — TRANEXAMIC ACID 1000 MG: 10 INJECTION, SOLUTION INTRAVENOUS at 12:05:00

## 2023-01-11 RX ADMIN — PHENYLEPHRINE HCL 100 MCG: 10 MG/ML VIAL (ML) INJECTION at 12:10:00

## 2023-01-11 RX ADMIN — PHENYLEPHRINE HCL 100 MCG: 10 MG/ML VIAL (ML) INJECTION at 12:05:00

## 2023-01-11 RX ADMIN — LIDOCAINE HYDROCHLORIDE 50 MG: 10 INJECTION, SOLUTION EPIDURAL; INFILTRATION; INTRACAUDAL; PERINEURAL at 11:53:00

## 2023-01-11 RX ADMIN — CLINDAMYCIN PHOSPHATE 900 MG: 150 INJECTION, SOLUTION INTRAVENOUS at 12:00:00

## 2023-01-11 RX ADMIN — SODIUM CHLORIDE, SODIUM LACTATE, POTASSIUM CHLORIDE, CALCIUM CHLORIDE: 600; 310; 30; 20 INJECTION, SOLUTION INTRAVENOUS at 11:50:00

## 2023-01-11 NOTE — ANESTHESIA PROCEDURE NOTES
Airway  Date/Time: 1/11/2023 11:57 AM  Urgency: elective      General Information and Staff    Patient location during procedure: OR  Anesthesiologist: Scar Spence MD  Performed: anesthesiologist     Indications and Patient Condition  Indications for airway management: anesthesia  Sedation level: deep  Preoxygenated: yes  Patient position: sniffing  Mask difficulty assessment: 3 - difficult mask (inadequate, unstable or two providers) +/- NMBA    Final Airway Details  Final airway type: endotracheal airway      Successful airway: ETT  Cuffed: yes   Successful intubation technique: Video laryngoscopy  Endotracheal tube insertion site: oral  Blade: GlideScope  Blade size: #3  ETT size (mm): 7.0    Cormack-Lehane Classification: grade I - full view of glottis  Placement verified by: capnometry   Measured from: lips  ETT to lips (cm): 21  Number of attempts at approach: 1    Additional Comments  Atraumatic x1, edentulous

## 2023-01-11 NOTE — BRIEF OP NOTE
Pre-Operative Diagnosis: left shoulder pain     Post-Operative Diagnosis: left shoulder pain      Procedure Performed:   left shoulder revision reverse arthroplasty    Surgeon(s) and Role:     January Burns MD - Primary    Assistant(s):  Surgical Assistant.: Peterson Whitman CSA  PA: NOREEN Osborne MD    Surgical Findings: same     Specimen: tissue cultures     Estimated Blood Loss: No data recorded      HODAN RAIN PA  1/11/2023  1:15 PM

## 2023-01-11 NOTE — ANESTHESIA PROCEDURE NOTES
Peripheral Block    Date/Time: 1/11/2023 11:40 AM  Performed by: Marlee Vázquez MD  Authorized by: Marlee Vázquez MD       General Information and Staff    Start Time:  1/11/2023 11:40 AM  End Time:  1/11/2023 11:44 AM  Anesthesiologist:  Marlee Vázquez MD  Performed by: Anesthesiologist  Patient Location:  PACU    Block Placement: Pre Induction  Site Identification: real time ultrasound guided and image stored and retrievable    Block site/laterality marked before start: site marked  Reason for Block: at surgeon's request and post-op pain management    Preanesthetic Checklist: 2 patient identifers, IV checked, risks and benefits discussed, monitors and equipment checked, pre-op evaluation, timeout performed, anesthesia consent, sterile technique used, no prohibitive neurological deficits and no local skin infection at insertion site      Procedure Details    Patient Position:  Supine  Prep: ChloraPrep    Monitoring:  Heart rate, cardiac monitor, continuous pulse ox and blood pressure cuff  Block Type:   Interscalene  Laterality:  Left  Injection Technique:  Single-shot    Needle    Needle Type:  Short-bevel and echogenic  Needle Gauge:  22 G  Needle Length:  50 mm  Needle Localization:  Ultrasound guidance  Reason for Ultrasound Use: appropriate spread of the medication was noted in real time and no ultrasound evidence of intravascular and/or intraneural injection            Assessment    Injection Assessment:  Good spread noted, incremental injection, low pressure, negative aspiration for heme, negative resistance and local visualized surrounding nerve on ultrasound      Medications  1/11/2023 11:40 AM  midazolam (VERSED)injection 2mg/2ml - Intravenous   2 mg - 1/11/2023 11:40:00 AM  lidocaine injection 1% - Intradermal   5 mL - 1/11/2023 11:40:00 AM  ropivacaine (NAROPIN) injection 0.5% - Infiltration   30 mL - 1/11/2023 11:40:00 AM  dexamethasone (DECADRON) PF injection 10 mg/ml - Injection   4 mg - 1/11/2023 11:40:00 AM    Additional Comments    Good image, atraumatic

## 2023-01-11 NOTE — INTERVAL H&P NOTE
Pre-op Diagnosis: pain    The above referenced H&P was reviewed by HODAN RAIN PA on 1/11/2023, the patient was examined and no significant changes have occurred in the patient's condition since the H&P was performed. I discussed with the patient and/or legal representative the potential benefits, risks and side effects of this procedure; the likelihood of the patient achieving goals; and potential problems that might occur during recuperation. I discussed reasonable alternatives to the procedure, including risks, benefits and side effects related to the alternatives and risks related to not receiving this procedure. We will proceed with procedure as planned.

## 2023-01-12 VITALS
HEART RATE: 67 BPM | SYSTOLIC BLOOD PRESSURE: 117 MMHG | HEIGHT: 66 IN | DIASTOLIC BLOOD PRESSURE: 68 MMHG | WEIGHT: 177 LBS | OXYGEN SATURATION: 94 % | TEMPERATURE: 98 F | BODY MASS INDEX: 28.45 KG/M2 | RESPIRATION RATE: 16 BRPM

## 2023-01-12 LAB
GLUCOSE BLDC GLUCOMTR-MCNC: 158 MG/DL (ref 70–99)
GLUCOSE BLDC GLUCOMTR-MCNC: 208 MG/DL (ref 70–99)

## 2023-01-12 PROCEDURE — 82962 GLUCOSE BLOOD TEST: CPT

## 2023-01-12 RX ORDER — ATENOLOL 50 MG/1
25 TABLET ORAL NIGHTLY
Refills: 0 | Status: SHIPPED | COMMUNITY
Start: 2023-01-12

## 2023-01-12 NOTE — DISCHARGE INSTRUCTIONS
Please note that some of your medications were prescribed outside of the epic computer system and may not be reflected in your after visit summary.

## 2023-01-12 NOTE — OPERATIVE REPORT
[facility]    PATIENT'S NAME: Lucetta Gilford RAMON   ATTENDING PHYSICIAN: Chelo Terrazas. Kathia Morillo MD   OPERATING PHYSICIAN: Andrew Ca MD   PATIENT ACCOUNT#:   [de-identified]    LOCATION:   Room 5 A [facility]  MEDICAL RECORD #:   [id number]       YOB: 1945  ADMISSION DATE:       01/11/2023      OPERATION DATE:  01/11/2023    OPERATIVE REPORT    PREOPERATIVE DIAGNOSIS:  Failed left reverse shoulder arthroplasty. POSTOPERATIVE DIAGNOSIS:  Failed left reverse shoulder arthroplasty. PROCEDURE:  Revision left reverse total shoulder arthroplasty. INDICATIONS:  This is a 74-year-old male who had previously undergone a reverse total shoulder arthroplasty. He had persistent pain and dysfunction. Preoperative workup demonstrated no obvious signs of infection on laboratory or aspirations, but the glenoid component was inserted high into the glenoid. We recommended revision of the humeral and ulnar components. OPERATIVE TECHNIQUE:  The prior deltopectoral incision was utilized to identify the cephalic vein, which was retracted laterally with the deltoid. We initially released the scar tissue from the lateral aspect of the humerus and medial aspect of the humerus exposing the component. There was no rotator cuff left, either on the superior aspect or anterior aspect. The shoulder was dislocated. We took 3 biopsies from the greater tuberosity, to include suture material.  There was no gross purulence, but there was scar underneath the trunnion and the stem. This was disarticulated with the use of a tuning fork. There was tissue noted underneath the trunnion, which was also sent for culture. We cleared out all scar tissue circumferentially from the humeral stem. There was noted to be significant wear of the glenoid tray, particularly in the inferior aspect, consistent with the notching seen on the preoperative CT scan. We then carefully performed a 360-degree release of the glenoid.   With the use of a tuning fork, I removed the glenosphere. We carefully inspected the base plate, it was placed in the proximal aspect of the glenoid. We performed a 360-degree release of the glenoid, removing all scar tissue. We then placed a trial 40 mm +3 lateralized component with maximum inferior offset to see if we can cover the inferior aspect of the glenoid. This left 1 mm of bone in the inferior aspect, which was marked with the Bovie. We then used a lateralized humeral tray and found excellent tension. We then removed the trial components. An osteotome was used to clear out the inferior ectopic bone from the glenoid. The final glenoid component was brought up on the field. After lavage and drying the baseplate, this was inserted into the glenoid with good fit. We then obtained a lateral tray, assembled this on the back table, inserted into the proximal humerus, and the shoulder was reduced. The arm was brought through a range of motion. There was no impingement noted with abduction or forward flexion and cross-body in the coracoid. There was no inferior impingement based on digital palpation. Intraoperative x-ray showed good position of the replacement. There was no rotator cuff left to repair. The shoulder was lavaged and closed in sequential layered fashion. It should be noted that we did take 2 cultures from the glenoid for total of 5. There were no intraoperative complications. All sponge and lap counts were correct at the end of the procedure.     Dictated By Sophy Vides MD  d: 01/12/2023 06:41:43  t: 01/12/2023 79:33:91  [facility] [id number]  CT/

## 2023-01-12 NOTE — PROGRESS NOTES
The patient is S/P Left revision  reverse total shoulder arthroplasty POD#1 Doing well pain controlled. Dressing intact   DELT/BIC/Tri/WE/WF/FF/FE/DI- Motor intact  POD#1 Left  Revision reverse total shoulder arthroplasty   Review procedure in detail including post-operative recovery and precautions.  Will D/C home later today

## 2023-03-02 ENCOUNTER — HOSPITAL ENCOUNTER (OUTPATIENT)
Dept: GENERAL RADIOLOGY | Facility: HOSPITAL | Age: 78
Discharge: HOME | End: 2023-03-02
Attending: ORTHOPAEDIC SURGERY
Payer: MEDICARE

## 2023-03-02 DIAGNOSIS — Z96.612 S/P REVERSE TOTAL SHOULDER ARTHROPLASTY, LEFT: ICD-10-CM

## 2023-03-02 PROCEDURE — 73030 X-RAY EXAM OF SHOULDER: CPT | Performed by: ORTHOPAEDIC SURGERY

## 2023-03-14 ENCOUNTER — EXTERNAL RECORD (OUTPATIENT)
Dept: OTHER | Age: 78
End: 2023-03-14

## 2023-06-12 ENCOUNTER — LAB ENCOUNTER (OUTPATIENT)
Dept: LAB | Facility: HOSPITAL | Age: 78
End: 2023-06-12
Attending: UROLOGY
Payer: MEDICARE

## 2023-06-12 DIAGNOSIS — C61 PROSTATE CANCER (HCC): ICD-10-CM

## 2023-06-12 DIAGNOSIS — N13.9 OBSTRUCTIVE UROPATHY: ICD-10-CM

## 2023-06-12 LAB
CREAT BLD-MCNC: 1.09 MG/DL
GFR SERPLBLD BASED ON 1.73 SQ M-ARVRAT: 69 ML/MIN/1.73M2 (ref 60–?)
PSA SERPL-MCNC: 0.08 NG/ML (ref ?–4)

## 2023-06-12 PROCEDURE — 84153 ASSAY OF PSA TOTAL: CPT

## 2023-06-12 PROCEDURE — 36415 COLL VENOUS BLD VENIPUNCTURE: CPT

## 2023-06-12 PROCEDURE — 82565 ASSAY OF CREATININE: CPT

## 2023-06-13 ENCOUNTER — HOSPITAL ENCOUNTER (OUTPATIENT)
Dept: GENERAL RADIOLOGY | Facility: HOSPITAL | Age: 78
Discharge: HOME | End: 2023-06-13
Attending: ORTHOPAEDIC SURGERY
Payer: MEDICARE

## 2023-06-13 DIAGNOSIS — Z96.612 S/P REVERSE TOTAL SHOULDER ARTHROPLASTY, LEFT: ICD-10-CM

## 2023-06-13 PROCEDURE — 73030 X-RAY EXAM OF SHOULDER: CPT | Performed by: ORTHOPAEDIC SURGERY

## 2024-01-02 ENCOUNTER — EXTERNAL RECORD (OUTPATIENT)
Dept: HEALTH INFORMATION MANAGEMENT | Facility: OTHER | Age: 79
End: 2024-01-02

## 2024-01-11 ENCOUNTER — HOSPITAL ENCOUNTER (OUTPATIENT)
Dept: GENERAL RADIOLOGY | Facility: HOSPITAL | Age: 79
Discharge: HOME | End: 2024-01-11
Attending: ORTHOPAEDIC SURGERY
Payer: MEDICARE

## 2024-01-11 DIAGNOSIS — Z96.612 S/P REVERSE TOTAL SHOULDER ARTHROPLASTY, LEFT: ICD-10-CM

## 2024-01-11 PROCEDURE — 73030 X-RAY EXAM OF SHOULDER: CPT | Performed by: ORTHOPAEDIC SURGERY

## 2024-01-19 DIAGNOSIS — M48.061 LUMBAR SPINAL STENOSIS: Primary | ICD-10-CM

## 2024-01-19 DIAGNOSIS — M48.00 SPINAL STENOSIS: Primary | ICD-10-CM

## 2024-01-19 DIAGNOSIS — M54.10 RADICULOPATHY: ICD-10-CM

## 2024-01-26 ENCOUNTER — EXTERNAL RECORD (OUTPATIENT)
Dept: HEALTH INFORMATION MANAGEMENT | Facility: OTHER | Age: 79
End: 2024-01-26

## 2024-01-26 ENCOUNTER — HOSPITAL ENCOUNTER (OUTPATIENT)
Dept: MRI IMAGING | Age: 79
End: 2024-01-26
Attending: INTERNAL MEDICINE

## 2024-01-26 DIAGNOSIS — M48.00 SPINAL STENOSIS: ICD-10-CM

## 2024-01-26 DIAGNOSIS — M54.10 RADICULOPATHY: ICD-10-CM

## 2024-01-26 PROCEDURE — 72148 MRI LUMBAR SPINE W/O DYE: CPT

## 2024-01-29 ENCOUNTER — TELEPHONE (OUTPATIENT)
Dept: NEUROSURGERY | Age: 79
End: 2024-01-29

## 2024-01-31 ENCOUNTER — APPOINTMENT (OUTPATIENT)
Dept: NEUROSURGERY | Age: 79
End: 2024-01-31

## 2024-02-05 ENCOUNTER — APPOINTMENT (OUTPATIENT)
Dept: NEUROSURGERY | Age: 79
End: 2024-02-05

## 2024-02-07 ENCOUNTER — PREP FOR CASE (OUTPATIENT)
Dept: NEUROSURGERY | Age: 79
End: 2024-02-07

## 2024-02-07 ENCOUNTER — APPOINTMENT (OUTPATIENT)
Dept: NEUROSURGERY | Age: 79
End: 2024-02-07

## 2024-02-07 VITALS
DIASTOLIC BLOOD PRESSURE: 62 MMHG | TEMPERATURE: 97.8 F | BODY MASS INDEX: 28.12 KG/M2 | HEART RATE: 60 BPM | WEIGHT: 175 LBS | HEIGHT: 66 IN | RESPIRATION RATE: 18 BRPM | SYSTOLIC BLOOD PRESSURE: 134 MMHG

## 2024-02-07 DIAGNOSIS — M54.12 CERVICAL RADICULOPATHY: ICD-10-CM

## 2024-02-07 DIAGNOSIS — M48.062 SPINAL STENOSIS OF LUMBAR REGION WITH NEUROGENIC CLAUDICATION: Primary | ICD-10-CM

## 2024-02-07 RX ORDER — ACETAMINOPHEN 325 MG/1
650 TABLET ORAL EVERY 4 HOURS PRN
COMMUNITY

## 2024-02-07 RX ORDER — GABAPENTIN 300 MG/1
600 CAPSULE ORAL AT BEDTIME
COMMUNITY

## 2024-02-07 RX ORDER — ROSUVASTATIN CALCIUM 40 MG/1
40 TABLET, COATED ORAL DAILY
COMMUNITY

## 2024-02-07 RX ORDER — OXYBUTYNIN CHLORIDE 10 MG/1
10 TABLET, EXTENDED RELEASE ORAL DAILY
COMMUNITY

## 2024-02-07 RX ORDER — ASPIRIN 81 MG/1
81 TABLET ORAL DAILY
COMMUNITY

## 2024-02-07 RX ORDER — VALSARTAN 160 MG/1
160 TABLET ORAL DAILY
COMMUNITY

## 2024-02-07 RX ORDER — NITROGLYCERIN 80 MG/1
1 PATCH TRANSDERMAL DAILY
COMMUNITY

## 2024-02-07 RX ORDER — ATENOLOL 25 MG/1
25 TABLET ORAL DAILY
COMMUNITY

## 2024-02-07 RX ORDER — ISOSORBIDE MONONITRATE 60 MG/1
60 TABLET, EXTENDED RELEASE ORAL DAILY
COMMUNITY

## 2024-02-22 ENCOUNTER — HOSPITAL ENCOUNTER (OUTPATIENT)
Dept: MRI IMAGING | Age: 79
Discharge: HOME | End: 2024-02-22
Attending: NEUROLOGICAL SURGERY

## 2024-02-22 DIAGNOSIS — M54.12 CERVICAL RADICULOPATHY: ICD-10-CM

## 2024-02-22 PROCEDURE — 72141 MRI NECK SPINE W/O DYE: CPT

## 2024-02-28 ENCOUNTER — TELEPHONE (OUTPATIENT)
Dept: NEUROSURGERY | Age: 79
End: 2024-02-28

## 2024-03-06 ENCOUNTER — APPOINTMENT (OUTPATIENT)
Dept: NEUROSURGERY | Age: 79
End: 2024-03-06

## 2024-05-07 ENCOUNTER — TELEPHONE (OUTPATIENT)
Dept: FAMILY MEDICINE CLINIC | Facility: CLINIC | Age: 79
End: 2024-05-07

## 2024-05-07 RX ORDER — CHOLECALCIFEROL (VITAMIN D3) 125 MCG
4000 CAPSULE ORAL DAILY
COMMUNITY

## 2024-05-07 RX ORDER — MULTIVIT-MIN/IRON FUM/FOLIC AC 7.5 MG-4
1 TABLET ORAL DAILY
COMMUNITY

## 2024-05-07 RX ORDER — ACETAMINOPHEN 500 MG
500 TABLET ORAL EVERY 6 HOURS PRN
COMMUNITY

## 2024-05-07 NOTE — TELEPHONE ENCOUNTER
L4-S1 Laminectomy, L4-5 Fusion on 05/17/24 with Dr. Palomares at [facility]    H&P- completed 05/08/24  Labs- FBS (128), A1C (6.5), Calculated Osmo (296), UA neg, MRSA neg, all other labs WNL  EKG- Sinus bradycardia with 1st degree A-V block, cannot rule out anterior infarct.  X-ray- No bacterial pneumonia, pleural effusion, or pneumothorax.   Minimal bibasilar atelectasis/scarring.  Postoperative changes involving the right perihilar region.   Lesser incidental findings described above.       Cardiology- Dr. Jordan Herrera  P- [phone]  F- [phone]  Last seen 06/15/23  ASA- hold 1 week  Cardiac Clx done 05/09/24 in Encounters Tab  \"No absolute cardiac contraindications to lumbar surgery no anginal or Congestive Sx or Hx BP acceptable EKG unremarkable Lexiscan 2/2024 w/o ischemia reassuring\"

## 2024-05-08 ENCOUNTER — LAB ENCOUNTER (OUTPATIENT)
Dept: LAB | Facility: HOSPITAL | Age: 79
End: 2024-05-08
Attending: FAMILY MEDICINE
Payer: MEDICARE

## 2024-05-08 ENCOUNTER — HOSPITAL ENCOUNTER (OUTPATIENT)
Dept: GENERAL RADIOLOGY | Facility: HOSPITAL | Age: 79
Discharge: HOME | End: 2024-05-08
Attending: FAMILY MEDICINE
Payer: MEDICARE

## 2024-05-08 ENCOUNTER — OFFICE VISIT (OUTPATIENT)
Dept: FAMILY MEDICINE CLINIC | Facility: CLINIC | Age: 79
End: 2024-05-08
Payer: MEDICARE

## 2024-05-08 VITALS
HEIGHT: 66 IN | DIASTOLIC BLOOD PRESSURE: 70 MMHG | SYSTOLIC BLOOD PRESSURE: 118 MMHG | OXYGEN SATURATION: 98 % | TEMPERATURE: 98 F | BODY MASS INDEX: 28.45 KG/M2 | WEIGHT: 177 LBS | HEART RATE: 53 BPM | RESPIRATION RATE: 16 BRPM

## 2024-05-08 DIAGNOSIS — R06.02 SHORTNESS OF BREATH: ICD-10-CM

## 2024-05-08 DIAGNOSIS — Z01.818 PREOPERATIVE EXAMINATION, UNSPECIFIED: ICD-10-CM

## 2024-05-08 DIAGNOSIS — I25.10 CORONARY ARTERY DISEASE INVOLVING NATIVE HEART, UNSPECIFIED VESSEL OR LESION TYPE, UNSPECIFIED WHETHER ANGINA PRESENT: Chronic | ICD-10-CM

## 2024-05-08 DIAGNOSIS — R73.01 ELEVATED FASTING BLOOD SUGAR: ICD-10-CM

## 2024-05-08 DIAGNOSIS — I10 ESSENTIAL HYPERTENSION: Chronic | ICD-10-CM

## 2024-05-08 DIAGNOSIS — Z01.818 PREOPERATIVE EXAMINATION, UNSPECIFIED: Primary | ICD-10-CM

## 2024-05-08 DIAGNOSIS — E78.5 HYPERLIPIDEMIA, UNSPECIFIED HYPERLIPIDEMIA TYPE: Chronic | ICD-10-CM

## 2024-05-08 DIAGNOSIS — Z01.812 PRE-OPERATIVE LABORATORY EXAMINATION: ICD-10-CM

## 2024-05-08 DIAGNOSIS — M48.061 SPINAL STENOSIS OF LUMBAR REGION, UNSPECIFIED WHETHER NEUROGENIC CLAUDICATION PRESENT: ICD-10-CM

## 2024-05-08 DIAGNOSIS — E11.69 TYPE 2 DIABETES MELLITUS WITH OTHER SPECIFIED COMPLICATION, UNSPECIFIED WHETHER LONG TERM INSULIN USE (HCC): ICD-10-CM

## 2024-05-08 LAB
ALBUMIN SERPL-MCNC: 4.6 G/DL (ref 3.2–4.8)
ALBUMIN/GLOB SERPL: 1.8 {RATIO} (ref 1–2)
ALP LIVER SERPL-CCNC: 80 U/L
ALT SERPL-CCNC: 23 U/L
ANION GAP SERPL CALC-SCNC: 4 MMOL/L (ref 0–18)
ANTIBODY SCREEN: NEGATIVE
APTT PPP: 27.4 SECONDS (ref 23–36)
AST SERPL-CCNC: 25 U/L (ref ?–34)
ATRIAL RATE: 46 BPM
BASOPHILS # BLD AUTO: 0.03 X10(3) UL (ref 0–0.2)
BASOPHILS NFR BLD AUTO: 0.6 %
BILIRUB SERPL-MCNC: 0.7 MG/DL (ref 0.2–1.1)
BILIRUB UR QL: NEGATIVE
BUN BLD-MCNC: 13 MG/DL (ref 9–23)
BUN/CREAT SERPL: 12.3 (ref 10–20)
CALCIUM BLD-MCNC: 9.6 MG/DL (ref 8.7–10.4)
CHLORIDE SERPL-SCNC: 107 MMOL/L (ref 98–112)
CLARITY UR: CLEAR
CO2 SERPL-SCNC: 31 MMOL/L (ref 21–32)
COLOR UR: COLORLESS
CREAT BLD-MCNC: 1.06 MG/DL
DEPRECATED RDW RBC AUTO: 43.8 FL (ref 35.1–46.3)
EGFRCR SERPLBLD CKD-EPI 2021: 71 ML/MIN/1.73M2 (ref 60–?)
EOSINOPHIL # BLD AUTO: 0.18 X10(3) UL (ref 0–0.7)
EOSINOPHIL NFR BLD AUTO: 3.4 %
ERYTHROCYTE [DISTWIDTH] IN BLOOD BY AUTOMATED COUNT: 13.5 % (ref 11–15)
EST. AVERAGE GLUCOSE BLD GHB EST-MCNC: 140 MG/DL (ref 68–126)
FASTING STATUS PATIENT QL REPORTED: NO
GFR SERPLBLD SCHWARTZ-ARVRAT: 71 ML/MIN/{1.73_M2}
GLOBULIN PLAS-MCNC: 2.5 G/DL (ref 2–3.5)
GLUCOSE BLD-MCNC: 128 MG/DL (ref 70–99)
GLUCOSE UR-MCNC: NORMAL MG/DL
HBA1C MFR BLD: 6.5 % (ref ?–5.7)
HCT VFR BLD AUTO: 45.5 %
HGB BLD-MCNC: 15.2 G/DL
HGB UR QL STRIP.AUTO: NEGATIVE
IMM GRANULOCYTES # BLD AUTO: 0.01 X10(3) UL (ref 0–1)
IMM GRANULOCYTES NFR BLD: 0.2 %
INR BLD: 1.06 (ref 0.8–1.2)
KETONES UR-MCNC: NEGATIVE MG/DL
LEUKOCYTE ESTERASE UR QL STRIP.AUTO: NEGATIVE
LYMPHOCYTES # BLD AUTO: 1.04 X10(3) UL (ref 1–4)
LYMPHOCYTES NFR BLD AUTO: 19.4 %
MCH RBC QN AUTO: 29.3 PG (ref 26–34)
MCHC RBC AUTO-ENTMCNC: 33.4 G/DL (ref 31–37)
MCV RBC AUTO: 87.8 FL
MONOCYTES # BLD AUTO: 0.46 X10(3) UL (ref 0.1–1)
MONOCYTES NFR BLD AUTO: 8.6 %
NEUTROPHILS # BLD AUTO: 3.63 X10 (3) UL (ref 1.5–7.7)
NEUTROPHILS # BLD AUTO: 3.63 X10(3) UL (ref 1.5–7.7)
NEUTROPHILS NFR BLD AUTO: 67.8 %
NITRITE UR QL STRIP.AUTO: NEGATIVE
OSMOLALITY SERPL CALC.SUM OF ELEC: 296 MOSM/KG (ref 275–295)
P AXIS: 37 DEGREES
P-R INTERVAL: 238 MS
PH UR: 5 [PH] (ref 5–8)
PLATELET # BLD AUTO: 179 10(3)UL (ref 150–450)
POTASSIUM SERPL-SCNC: 4.5 MMOL/L (ref 3.5–5.1)
PROT SERPL-MCNC: 7.1 G/DL (ref 5.7–8.2)
PROT UR-MCNC: NEGATIVE MG/DL
PROTHROMBIN TIME: 14.5 SECONDS (ref 11.6–14.8)
Q-T INTERVAL: 454 MS
QRS DURATION: 68 MS
QTC CALCULATION (BEZET): 397 MS
R AXIS: 16 DEGREES
RBC # BLD AUTO: 5.18 X10(6)UL
RH BLOOD TYPE: POSITIVE
SODIUM SERPL-SCNC: 142 MMOL/L (ref 136–145)
SP GR UR STRIP: 1.01 (ref 1–1.03)
T AXIS: 51 DEGREES
UROBILINOGEN UR STRIP-ACNC: NORMAL
VENTRICULAR RATE: 46 BPM
WBC # BLD AUTO: 5.4 X10(3) UL (ref 4–11)

## 2024-05-08 PROCEDURE — 93005 ELECTROCARDIOGRAM TRACING: CPT

## 2024-05-08 PROCEDURE — 36415 COLL VENOUS BLD VENIPUNCTURE: CPT

## 2024-05-08 PROCEDURE — 86850 RBC ANTIBODY SCREEN: CPT | Performed by: FAMILY MEDICINE

## 2024-05-08 PROCEDURE — 99204 OFFICE O/P NEW MOD 45 MIN: CPT | Performed by: FAMILY MEDICINE

## 2024-05-08 PROCEDURE — 81003 URINALYSIS AUTO W/O SCOPE: CPT

## 2024-05-08 PROCEDURE — 83036 HEMOGLOBIN GLYCOSYLATED A1C: CPT

## 2024-05-08 PROCEDURE — 85025 COMPLETE CBC W/AUTO DIFF WBC: CPT

## 2024-05-08 PROCEDURE — 71046 X-RAY EXAM CHEST 2 VIEWS: CPT | Performed by: FAMILY MEDICINE

## 2024-05-08 PROCEDURE — 87081 CULTURE SCREEN ONLY: CPT

## 2024-05-08 PROCEDURE — 93010 ELECTROCARDIOGRAM REPORT: CPT | Performed by: INTERNAL MEDICINE

## 2024-05-08 PROCEDURE — 85610 PROTHROMBIN TIME: CPT

## 2024-05-08 PROCEDURE — 86901 BLOOD TYPING SEROLOGIC RH(D): CPT | Performed by: FAMILY MEDICINE

## 2024-05-08 PROCEDURE — 80053 COMPREHEN METABOLIC PANEL: CPT

## 2024-05-08 PROCEDURE — 86900 BLOOD TYPING SEROLOGIC ABO: CPT | Performed by: FAMILY MEDICINE

## 2024-05-08 PROCEDURE — 85730 THROMBOPLASTIN TIME PARTIAL: CPT

## 2024-05-10 PROBLEM — M48.061 LUMBAR SPINAL STENOSIS: Status: ACTIVE | Noted: 2024-05-10

## 2024-05-11 NOTE — H&P
HPI:                                                                                                                                           PRE-OP NOTE  HISTORY AND PHYSICAL                                                                                                                                                                                                                                         I have been consulted by Dr. Palomares to see Ray Bustillo 79 year old male for a preoperative evaluation and medical clearance. Ray has a long history of worsening severe degenerative lumbar spinal stenosis. Patient is to have L4-S1 Laminectomy, L4-5 fusion on 5/17/24 with Dr Palomares      Pt suffers significant pain and loss of function.     No cardiopulmonary symptoms.     No history of JODI or DVT. Denies tobacco use.       Family History   Problem Relation Age of Onset    Heart Disorder Father     Cancer Mother     Heart Disorder Brother       Current Outpatient Medications   Medication Sig Dispense Refill    acetaminophen 500 MG Oral Tab Take 1 tablet (500 mg total) by mouth every 6 (six) hours as needed for Pain.      Multiple Vitamins-Minerals (MULTI-VITAMIN/MINERALS) Oral Tab Take 1 tablet by mouth daily.      cholecalciferol 50 MCG (2000 UT) Oral Tab Take 2 tablets (4,000 Units total) by mouth daily.      atenolol 50 MG Oral Tab Take 0.5 tablets (25 mg total) by mouth at bedtime.  0    Isosorbide Mononitrate ER 60 MG Oral Tablet 24 Hr Take 1 tablet (60 mg total) by mouth daily.      valsartan 160 MG Oral Tab Take 1 tablet (160 mg total) by mouth daily.      Probiotic Product (PROBIOTIC ADVANCED OR) Take by mouth daily.        Rosuvastatin Calcium 40 MG Oral Tab Take 0.5 tablets (20 mg total) by mouth nightly.      gabapentin 600 MG Oral Tab Take 1 tablet (600 mg total) by mouth 3 (three) times daily. Take 600 mg twice daily and 900 mg HS      aspirin 81 MG Oral Tab Take 1 tablet (81 mg total) by  mouth daily.      Coenzyme Q10 (CO Q-10) 300 MG Oral Cap Take 300 mg by mouth daily.      ibuprofen 200 MG Oral Tab Take 1 tablet (200 mg total) by mouth every 6 (six) hours as needed for Pain.      Oxybutynin Chloride ER (DITROPAN-XL) 10 MG Oral Take 1 tablet (10 mg total) by mouth daily.      ALFUZOSIN HCL 10 MG OR TB24 Take 1 tablet (10 mg total) by mouth at bedtime.      METFORMIN  MG (MOD) OR TB24 Take 500 tablets by mouth daily.      nitroGLYCERIN 0.4 MG Sublingual SL Tab Place 1 tablet (0.4 mg total) under the tongue every 5 (five) minutes as needed for Chest pain. (Patient not taking: Reported on 5/8/2024) 30 tablet 1     Past Medical History:    Back problem    Coronary atherosclerosis    Diabetes (HCC)    Diabetes type 2, controlled (HCC)    Esophageal reflux    Hearing impairment    hearing aid    Heart attack (HCC)    silent    High blood pressure    High cholesterol    Histoplasmosis    Neuropathy    Osteoarthritis    Other and unspecified hyperlipidemia    Precordial pain    Prostate CA (HCC)    Hx of seed implants    Unspecified sleep apnea    CPAP    Urinary leakage    Visual impairment    glasses    VT (ventricular tachycardia) ([facility])     Past Surgical History:   Procedure Laterality Date    Hernia surgery Bilateral     inguinal    Other surgical history  1991, 2003    B/L shoulder RCR    Other surgical history      Rt lung surgery lesions exc    Other surgical history      angiograms    Other surgical history      bx and prostate seed implants    Other surgical history Right     dislocation shoulder with reduction    Total knee replacement Right      Social History     Socioeconomic History    Marital status:      Spouse name: Not on file    Number of children: Not on file    Years of education: Not on file    Highest education level: Not on file   Occupational History    Not on file   Tobacco Use    Smoking status: Former     Current packs/day: 0.00     Types: Cigarettes     Start  date: 1965     Quit date: 1981     Years since quittin.3     Passive exposure: Past    Smokeless tobacco: Never   Vaping Use    Vaping status: Never Used   Substance and Sexual Activity    Alcohol use: No    Drug use: No    Sexual activity: Not on file   Other Topics Concern    Not on file   Social History Narrative    Not on file     Social Determinants of Health     Financial Resource Strain: Not on file   Food Insecurity: Not on file   Transportation Needs: Not on file   Physical Activity: Not on file   Stress: Not on file   Social Connections: Not on file   Housing Stability: Not on file       REVIEW OF SYSTEMS:   CONSTITUTIONAL:  Denies unusual weight gain/loss, fever, chills  EENT:  Eyes:  Denies eye pain, visual loss, blurred vision, double vision. Ears, Nose, Throat:  Denies congestion, runny nose or sore throat.  INTEGUMENTARY:  Denies rashes, itching, skin lesion,   CARDIOVASCULAR:  Denies DVT. Denies chest pain, palpitations, edema, dyspnea  RESPIRATORY:  JODI ,Denies shortness of breath, wheezing, cough  GASTROINTESTINAL:  Denies abdominal pain, nausea, vomiting, constipation, diarrhea, or blood in stool.  MUSCULOSKELETAL:  severe pain as noted above  NEUROLOGICAL:  Denies headache, seizures, dizziness, syncope, paralysis, ataxia,  HEMATOLOGIC:  Denies anemia, bleeding or bruising.  LYMPHATICS:  Denies enlarged nodes   PSYCHIATRIC:  Denies depression or anxiety.  ENDOCRINOLOGIC: DM 2 yes  ALLERGIES:  Denies allergic response, history of asthma, hives,     EXAM:   /70 (BP Location: Left arm)   Pulse 53   Temp 98 °F (36.7 °C) (Temporal)   Resp 16   Ht 5' 6\" (1.676 m)   Wt 177 lb (80.3 kg)   SpO2 98%   BMI 28.57 kg/m²  Estimated body mass index is 28.57 kg/m² as calculated from the following:    Height as of this encounter: 5' 6\" (1.676 m).    Weight as of this encounter: 177 lb (80.3 kg).   Vital signs reviewed.Appears stated age, well groomed.  Physical Exam:  GEN:  Patient is  alert, awake and oriented, well developed, well nourished, no apparent distress.  HEENT:  Head:  Normocephalic, atraumatic Eyes: EOMI, no scleral icterus, conjunctivae clear bilaterally, no eye discharge Nose: patent, no nasal discharge   NECK: Supple, no CLAD, no carotid bruit, no thyromegaly.  SKIN: No rashes, no skin lesion, no bruising, good turgor.  HEART:  Regular rate and rhythm, no murmurs, rubs or gallops.  LUNGS: Clear to auscultation bilterally, no rales/rhonchi/wheezing.  CHEST: No tenderness.  ABDOMEN:  Soft, nondistended, nontender,  no masses, no hepatosplenomegaly.  BACK: No tenderness, no spasm,   EXTREMITIES:  No edema, no cyanosis, no clubbing,   NEURO:  No focal deficit, speech fluent, normal gait, strength and tone, sensory intact      Lab Results   Component Value Date    WBC 5.4 05/08/2024    HGB 15.2 05/08/2024    HCT 45.5 05/08/2024    .0 05/08/2024    CREATSERUM 1.06 05/08/2024    BUN 13 05/08/2024     05/08/2024    K 4.5 05/08/2024     05/08/2024    CO2 31.0 05/08/2024     (H) 05/08/2024    CA 9.6 05/08/2024    ALB 4.6 05/08/2024    ALKPHO 80 05/08/2024    BILT 0.7 05/08/2024    TP 7.1 05/08/2024    AST 25 05/08/2024    ALT 23 05/08/2024    PTT 27.4 05/08/2024    INR 1.06 05/08/2024    PSA 0.08 06/12/2023    ESRML 16 09/29/2022    CRP <0.29 09/29/2022       No results found.          ASSESSMENT AND PLAN:   Ray Bustillo is a 79 year old male, with a hx of severe degenerative lumbar spinal stenosis. Patient is to have L4-S1 Laminectomy, L4-5 fusion on 5/17/24 with Dr Palomares at [facility]       3. Spinal stenosis of lumbar region, unspecified whether neurogenic claudication present  M48.061       4. Coronary artery disease involving native heart, unspecified vessel or lesion type, unspecified whether angina present  I25.10       5. Essential hypertension  I10       6. Hyperlipidemia, unspecified hyperlipidemia type  E78.5       7. Type 2 diabetes  mellitus with other specified complication, unspecified whether long term insulin use (HCC)  E11.69       8. Elevated fasting blood sugar  R73.01 CBC With Differential With Platelet     Comp Metabolic Panel (14)     EKG 12 Lead     Hemoglobin A1C     MSSA and MRSA Culture Screen     XR CHEST PA + LAT CHEST (CPT=71046)     Urinalysis with Culture Reflex     Type and screen     PTT, Activated     Prothrombin Time (PT)      9. Shortness of breath  R06.02 CBC With Differential With Platelet     Comp Metabolic Panel (14)     EKG 12 Lead     Hemoglobin A1C     MSSA and MRSA Culture Screen     XR CHEST PA + LAT CHEST (CPT=71046)     Urinalysis with Culture Reflex     Type and screen     PTT, Activated     Prothrombin Time (PT)             ECG and labs have been reviewed and are in acceptable range for surgery.     Preoperative Risk Stratification: There are no decompensated medical conditions. ASA classification 2      Patient has an RCRI score that is low risk % risk for major cardiac event in the perioperative period.     Patient is medically optimized and has an acceptable risk of surgery and may proceed with surgery as planned.     PLAN:    Patient to discontinue medications and supplements with anticoagulation properties as per instruction.       Postoperative Recommendations:    Anticoagulation / DVT prophylaxis: SCDs, as per Dr. Palomares. Early ambulation   GI protection: Protonix  Incentive Spirometry   Telemetry as needed  CPAP/O2 as needed     DM: QID glucoscans and sliding scale insulin as needed     Renal protection: (hydration / NSAID and ACE/ARB avoidance)     Cognitive protection: (minimize narcotics, benzodiazepines, scopolamine)       Pain management and Physical therapy as per Orthopedic service.   Home Health as needed    Thank you for the opportunity to care for your patient and to assist in managing the postoperative course.        Get Hudson MD  5/10/2024  10:13 PM

## 2024-05-13 ENCOUNTER — CLINICAL ABSTRACT (OUTPATIENT)
Dept: CARE COORDINATION | Age: 79
End: 2024-05-13

## 2024-05-13 NOTE — TELEPHONE ENCOUNTER
Dr. Hudson, please review:    Labs- FBS (128), A1C (6.5), Calculated Osmo (296), UA neg, MRSA neg, all other labs WNL    EKG- Sinus bradycardia with 1st degree A-V block, cannot rule out anterior infarct.    X-ray- No bacterial pneumonia, pleural effusion, or pneumothorax.   Minimal bibasilar atelectasis/scarring.  Postoperative changes involving the right perihilar region.   Lesser incidental findings described above.       Cardiology- Dr. Jordan Herrera  ASA- hold 1 week  Cardiac Clx done 05/09/24 in Encounters Tab  \"No absolute cardiac contraindications to lumbar surgery no anginal or Congestive Sx or Hx BP acceptable EKG unremarkable Lexiscan 2/2024 w/o ischemia reassuring\"    OK for surgery?

## 2024-05-14 NOTE — TELEPHONE ENCOUNTER
Cardiology clearance appreciated.     Chart and results reviewed and are acceptable for surgery. Pt is medically clear to proceed with surgery as planned.

## 2024-05-15 NOTE — TELEPHONE ENCOUNTER
Left message for patient with test results and let him know he is clear for surgery per Dr. Hudson.

## 2024-05-17 ENCOUNTER — ANESTHESIA EVENT (OUTPATIENT)
Dept: SURGERY | Facility: HOSPITAL | Age: 79
DRG: 460 | End: 2024-05-17

## 2024-05-17 ENCOUNTER — HOSPITAL ENCOUNTER (INPATIENT)
Facility: HOSPITAL | Age: 79
LOS: 4 days | Discharge: HOME HEALTH | DRG: 460 | End: 2024-05-21
Attending: ORTHOPAEDIC SURGERY | Admitting: ORTHOPAEDIC SURGERY

## 2024-05-17 ENCOUNTER — ANESTHESIA (OUTPATIENT)
Dept: SURGERY | Facility: HOSPITAL | Age: 79
DRG: 460 | End: 2024-05-17

## 2024-05-17 DIAGNOSIS — Z98.1 S/P LUMBAR FUSION: Primary | ICD-10-CM

## 2024-05-17 LAB
GLUCOSE BLDC GLUCOMTR-MCNC: 126 MG/DL (ref 70–99)
GLUCOSE BLDC GLUCOMTR-MCNC: 132 MG/DL (ref 70–99)
GLUCOSE BLDC GLUCOMTR-MCNC: 141 MG/DL (ref 70–99)
GLUCOSE BLDC GLUCOMTR-MCNC: 202 MG/DL (ref 70–99)
RH BLOOD TYPE: POSITIVE

## 2024-05-17 PROCEDURE — 01NB0ZZ RELEASE LUMBAR NERVE, OPEN APPROACH: ICD-10-PCS | Performed by: ORTHOPAEDIC SURGERY

## 2024-05-17 PROCEDURE — 82962 GLUCOSE BLOOD TEST: CPT

## 2024-05-17 PROCEDURE — 94660 CPAP INITIATION&MGMT: CPT

## 2024-05-17 PROCEDURE — 01NR0ZZ RELEASE SACRAL NERVE, OPEN APPROACH: ICD-10-PCS | Performed by: ORTHOPAEDIC SURGERY

## 2024-05-17 PROCEDURE — 94799 UNLISTED PULMONARY SVC/PX: CPT

## 2024-05-17 PROCEDURE — 0SG0071 FUSION OF LUMBAR VERTEBRAL JOINT WITH AUTOLOGOUS TISSUE SUBSTITUTE, POSTERIOR APPROACH, POSTERIOR COLUMN, OPEN APPROACH: ICD-10-PCS | Performed by: ORTHOPAEDIC SURGERY

## 2024-05-17 DEVICE — ROD SPNL 5.5X35MM LORD TI RELINE MAS: Type: IMPLANTABLE DEVICE | Site: BACK | Status: FUNCTIONAL

## 2024-05-17 DEVICE — SCREW SPNL 5.5MM LOK OPN TULIP RELINE: Type: IMPLANTABLE DEVICE | Site: BACK | Status: FUNCTIONAL

## 2024-05-17 DEVICE — K WIRE FIX NIT BVL BLNT TIP PRECEPT: Type: IMPLANTABLE DEVICE | Site: BACK

## 2024-05-17 DEVICE — SCREW SPNL 6.5X45MM 2C REDUC POLYAX RELINE: Type: IMPLANTABLE DEVICE | Site: BACK | Status: FUNCTIONAL

## 2024-05-17 RX ORDER — SODIUM CHLORIDE, SODIUM LACTATE, POTASSIUM CHLORIDE, CALCIUM CHLORIDE 600; 310; 30; 20 MG/100ML; MG/100ML; MG/100ML; MG/100ML
INJECTION, SOLUTION INTRAVENOUS CONTINUOUS
Status: DISCONTINUED | OUTPATIENT
Start: 2024-05-17 | End: 2024-05-21

## 2024-05-17 RX ORDER — DEXTROSE MONOHYDRATE 25 G/50ML
50 INJECTION, SOLUTION INTRAVENOUS
Status: DISCONTINUED | OUTPATIENT
Start: 2024-05-17 | End: 2024-05-17 | Stop reason: HOSPADM

## 2024-05-17 RX ORDER — BUPIVACAINE HYDROCHLORIDE AND EPINEPHRINE 5; 5 MG/ML; UG/ML
INJECTION, SOLUTION PERINEURAL AS NEEDED
Status: DISCONTINUED | OUTPATIENT
Start: 2024-05-17 | End: 2024-05-17 | Stop reason: HOSPADM

## 2024-05-17 RX ORDER — HYDRALAZINE HYDROCHLORIDE 25 MG/1
25 TABLET, FILM COATED ORAL 3 TIMES DAILY PRN
Status: DISCONTINUED | OUTPATIENT
Start: 2024-05-17 | End: 2024-05-18

## 2024-05-17 RX ORDER — HYDROMORPHONE HYDROCHLORIDE 1 MG/ML
0.6 INJECTION, SOLUTION INTRAMUSCULAR; INTRAVENOUS; SUBCUTANEOUS EVERY 5 MIN PRN
Status: DISCONTINUED | OUTPATIENT
Start: 2024-05-17 | End: 2024-05-17 | Stop reason: HOSPADM

## 2024-05-17 RX ORDER — METOCLOPRAMIDE HYDROCHLORIDE 5 MG/ML
10 INJECTION INTRAMUSCULAR; INTRAVENOUS EVERY 8 HOURS PRN
Status: DISCONTINUED | OUTPATIENT
Start: 2024-05-17 | End: 2024-05-21

## 2024-05-17 RX ORDER — ALFUZOSIN HYDROCHLORIDE 10 MG/1
10 TABLET, EXTENDED RELEASE ORAL NIGHTLY
Status: DISCONTINUED | OUTPATIENT
Start: 2024-05-17 | End: 2024-05-17 | Stop reason: ALTCHOICE

## 2024-05-17 RX ORDER — SODIUM CHLORIDE 9 MG/ML
INJECTION, SOLUTION INTRAVENOUS CONTINUOUS PRN
Status: DISCONTINUED | OUTPATIENT
Start: 2024-05-17 | End: 2024-05-17 | Stop reason: SURG

## 2024-05-17 RX ORDER — ONDANSETRON 2 MG/ML
4 INJECTION INTRAMUSCULAR; INTRAVENOUS EVERY 6 HOURS PRN
Status: DISCONTINUED | OUTPATIENT
Start: 2024-05-17 | End: 2024-05-21

## 2024-05-17 RX ORDER — NALOXONE HYDROCHLORIDE 0.4 MG/ML
0.08 INJECTION, SOLUTION INTRAMUSCULAR; INTRAVENOUS; SUBCUTANEOUS AS NEEDED
Status: DISCONTINUED | OUTPATIENT
Start: 2024-05-17 | End: 2024-05-17 | Stop reason: HOSPADM

## 2024-05-17 RX ORDER — ROCURONIUM BROMIDE 10 MG/ML
INJECTION, SOLUTION INTRAVENOUS AS NEEDED
Status: DISCONTINUED | OUTPATIENT
Start: 2024-05-17 | End: 2024-05-17 | Stop reason: SURG

## 2024-05-17 RX ORDER — DIPHENHYDRAMINE HYDROCHLORIDE 50 MG/ML
25 INJECTION INTRAMUSCULAR; INTRAVENOUS EVERY 4 HOURS PRN
Status: DISCONTINUED | OUTPATIENT
Start: 2024-05-17 | End: 2024-05-21

## 2024-05-17 RX ORDER — OXYCODONE HYDROCHLORIDE 5 MG/1
5 TABLET ORAL EVERY 4 HOURS PRN
Status: DISCONTINUED | OUTPATIENT
Start: 2024-05-17 | End: 2024-05-21

## 2024-05-17 RX ORDER — HYDROMORPHONE HYDROCHLORIDE 1 MG/ML
0.4 INJECTION, SOLUTION INTRAMUSCULAR; INTRAVENOUS; SUBCUTANEOUS EVERY 5 MIN PRN
Status: DISCONTINUED | OUTPATIENT
Start: 2024-05-17 | End: 2024-05-17 | Stop reason: HOSPADM

## 2024-05-17 RX ORDER — ISOSORBIDE MONONITRATE 60 MG/1
60 TABLET, EXTENDED RELEASE ORAL DAILY
Status: DISCONTINUED | OUTPATIENT
Start: 2024-05-18 | End: 2024-05-21

## 2024-05-17 RX ORDER — NICOTINE POLACRILEX 4 MG
15 LOZENGE BUCCAL
Status: DISCONTINUED | OUTPATIENT
Start: 2024-05-17 | End: 2024-05-17 | Stop reason: HOSPADM

## 2024-05-17 RX ORDER — MIDAZOLAM HYDROCHLORIDE 1 MG/ML
INJECTION INTRAMUSCULAR; INTRAVENOUS AS NEEDED
Status: DISCONTINUED | OUTPATIENT
Start: 2024-05-17 | End: 2024-05-17 | Stop reason: SURG

## 2024-05-17 RX ORDER — MORPHINE SULFATE 4 MG/ML
2 INJECTION, SOLUTION INTRAMUSCULAR; INTRAVENOUS EVERY 10 MIN PRN
Status: DISCONTINUED | OUTPATIENT
Start: 2024-05-17 | End: 2024-05-17 | Stop reason: HOSPADM

## 2024-05-17 RX ORDER — ATENOLOL 25 MG/1
25 TABLET ORAL NIGHTLY
Status: DISCONTINUED | OUTPATIENT
Start: 2024-05-17 | End: 2024-05-21

## 2024-05-17 RX ORDER — PHENYLEPHRINE HCL 10 MG/ML
VIAL (ML) INJECTION AS NEEDED
Status: DISCONTINUED | OUTPATIENT
Start: 2024-05-17 | End: 2024-05-17 | Stop reason: SURG

## 2024-05-17 RX ORDER — NITROGLYCERIN 0.4 MG/1
0.4 TABLET SUBLINGUAL EVERY 5 MIN PRN
Status: DISCONTINUED | OUTPATIENT
Start: 2024-05-17 | End: 2024-05-21

## 2024-05-17 RX ORDER — EPHEDRINE SULFATE 50 MG/ML
INJECTION, SOLUTION INTRAVENOUS AS NEEDED
Status: DISCONTINUED | OUTPATIENT
Start: 2024-05-17 | End: 2024-05-17 | Stop reason: SURG

## 2024-05-17 RX ORDER — MORPHINE SULFATE 4 MG/ML
4 INJECTION, SOLUTION INTRAMUSCULAR; INTRAVENOUS EVERY 10 MIN PRN
Status: DISCONTINUED | OUTPATIENT
Start: 2024-05-17 | End: 2024-05-17 | Stop reason: HOSPADM

## 2024-05-17 RX ORDER — LIDOCAINE HYDROCHLORIDE 10 MG/ML
INJECTION, SOLUTION EPIDURAL; INFILTRATION; INTRACAUDAL; PERINEURAL AS NEEDED
Status: DISCONTINUED | OUTPATIENT
Start: 2024-05-17 | End: 2024-05-17 | Stop reason: SURG

## 2024-05-17 RX ORDER — DIPHENHYDRAMINE HCL 25 MG
25 CAPSULE ORAL EVERY 4 HOURS PRN
Status: DISCONTINUED | OUTPATIENT
Start: 2024-05-17 | End: 2024-05-21

## 2024-05-17 RX ORDER — HYDROMORPHONE HYDROCHLORIDE 1 MG/ML
0.2 INJECTION, SOLUTION INTRAMUSCULAR; INTRAVENOUS; SUBCUTANEOUS EVERY 5 MIN PRN
Status: DISCONTINUED | OUTPATIENT
Start: 2024-05-17 | End: 2024-05-17 | Stop reason: HOSPADM

## 2024-05-17 RX ORDER — HYDROMORPHONE HYDROCHLORIDE 1 MG/ML
0.4 INJECTION, SOLUTION INTRAMUSCULAR; INTRAVENOUS; SUBCUTANEOUS EVERY 2 HOUR PRN
Status: DISCONTINUED | OUTPATIENT
Start: 2024-05-17 | End: 2024-05-21

## 2024-05-17 RX ORDER — NICOTINE POLACRILEX 4 MG
30 LOZENGE BUCCAL
Status: DISCONTINUED | OUTPATIENT
Start: 2024-05-17 | End: 2024-05-17 | Stop reason: HOSPADM

## 2024-05-17 RX ORDER — OXYCODONE HYDROCHLORIDE 5 MG/1
2.5 TABLET ORAL EVERY 4 HOURS PRN
Status: DISCONTINUED | OUTPATIENT
Start: 2024-05-17 | End: 2024-05-21

## 2024-05-17 RX ORDER — TAMSULOSIN HYDROCHLORIDE 0.4 MG/1
0.4 CAPSULE ORAL
Status: DISCONTINUED | OUTPATIENT
Start: 2024-05-18 | End: 2024-05-18

## 2024-05-17 RX ORDER — POLYETHYLENE GLYCOL 3350 17 G/17G
17 POWDER, FOR SOLUTION ORAL DAILY PRN
Status: DISCONTINUED | OUTPATIENT
Start: 2024-05-17 | End: 2024-05-21

## 2024-05-17 RX ORDER — SENNOSIDES 8.6 MG
17.2 TABLET ORAL NIGHTLY
Status: DISCONTINUED | OUTPATIENT
Start: 2024-05-17 | End: 2024-05-21

## 2024-05-17 RX ORDER — BISACODYL 10 MG
10 SUPPOSITORY, RECTAL RECTAL
Status: DISCONTINUED | OUTPATIENT
Start: 2024-05-17 | End: 2024-05-21

## 2024-05-17 RX ORDER — METFORMIN HYDROCHLORIDE 500 MG/1
500 TABLET, FILM COATED, EXTENDED RELEASE ORAL DAILY
Status: DISCONTINUED | OUTPATIENT
Start: 2024-05-18 | End: 2024-05-17 | Stop reason: ALTCHOICE

## 2024-05-17 RX ORDER — SODIUM CHLORIDE, SODIUM LACTATE, POTASSIUM CHLORIDE, CALCIUM CHLORIDE 600; 310; 30; 20 MG/100ML; MG/100ML; MG/100ML; MG/100ML
INJECTION, SOLUTION INTRAVENOUS CONTINUOUS
Status: DISCONTINUED | OUTPATIENT
Start: 2024-05-17 | End: 2024-05-17 | Stop reason: HOSPADM

## 2024-05-17 RX ORDER — GABAPENTIN 600 MG/1
600 TABLET ORAL 3 TIMES DAILY
Status: DISCONTINUED | OUTPATIENT
Start: 2024-05-17 | End: 2024-05-21

## 2024-05-17 RX ORDER — DOCUSATE SODIUM 100 MG/1
100 CAPSULE, LIQUID FILLED ORAL 2 TIMES DAILY
Status: DISCONTINUED | OUTPATIENT
Start: 2024-05-17 | End: 2024-05-21

## 2024-05-17 RX ORDER — ENEMA 19; 7 G/133ML; G/133ML
1 ENEMA RECTAL ONCE AS NEEDED
Status: DISCONTINUED | OUTPATIENT
Start: 2024-05-17 | End: 2024-05-21

## 2024-05-17 RX ORDER — HYDROMORPHONE HYDROCHLORIDE 1 MG/ML
0.2 INJECTION, SOLUTION INTRAMUSCULAR; INTRAVENOUS; SUBCUTANEOUS EVERY 2 HOUR PRN
Status: DISCONTINUED | OUTPATIENT
Start: 2024-05-17 | End: 2024-05-21

## 2024-05-17 RX ORDER — DIAZEPAM 2 MG/1
2 TABLET ORAL EVERY 6 HOURS PRN
Status: DISCONTINUED | OUTPATIENT
Start: 2024-05-17 | End: 2024-05-21

## 2024-05-17 RX ORDER — ACETAMINOPHEN 500 MG
1000 TABLET ORAL ONCE
Status: COMPLETED | OUTPATIENT
Start: 2024-05-17 | End: 2024-05-17

## 2024-05-17 RX ORDER — MORPHINE SULFATE 10 MG/ML
6 INJECTION, SOLUTION INTRAMUSCULAR; INTRAVENOUS EVERY 10 MIN PRN
Status: DISCONTINUED | OUTPATIENT
Start: 2024-05-17 | End: 2024-05-17 | Stop reason: HOSPADM

## 2024-05-17 RX ORDER — ROSUVASTATIN CALCIUM 20 MG/1
20 TABLET, COATED ORAL NIGHTLY
Status: DISCONTINUED | OUTPATIENT
Start: 2024-05-17 | End: 2024-05-21

## 2024-05-17 RX ORDER — DIAZEPAM 5 MG/ML
2.5 INJECTION, SOLUTION INTRAMUSCULAR; INTRAVENOUS ONCE
Status: COMPLETED | OUTPATIENT
Start: 2024-05-17 | End: 2024-05-17

## 2024-05-17 RX ORDER — OXYBUTYNIN CHLORIDE 10 MG/1
10 TABLET, EXTENDED RELEASE ORAL DAILY
Status: DISCONTINUED | OUTPATIENT
Start: 2024-05-18 | End: 2024-05-21

## 2024-05-17 RX ADMIN — EPHEDRINE SULFATE 5 MG: 50 INJECTION, SOLUTION INTRAVENOUS at 14:00:00

## 2024-05-17 RX ADMIN — SODIUM CHLORIDE: 9 INJECTION, SOLUTION INTRAVENOUS at 13:13:00

## 2024-05-17 RX ADMIN — SODIUM CHLORIDE, SODIUM LACTATE, POTASSIUM CHLORIDE, CALCIUM CHLORIDE: 600; 310; 30; 20 INJECTION, SOLUTION INTRAVENOUS at 12:37:00

## 2024-05-17 RX ADMIN — SODIUM CHLORIDE: 9 INJECTION, SOLUTION INTRAVENOUS at 14:00:00

## 2024-05-17 RX ADMIN — EPHEDRINE SULFATE 5 MG: 50 INJECTION, SOLUTION INTRAVENOUS at 15:10:00

## 2024-05-17 RX ADMIN — EPHEDRINE SULFATE 5 MG: 50 INJECTION, SOLUTION INTRAVENOUS at 13:15:00

## 2024-05-17 RX ADMIN — SODIUM CHLORIDE, SODIUM LACTATE, POTASSIUM CHLORIDE, CALCIUM CHLORIDE: 600; 310; 30; 20 INJECTION, SOLUTION INTRAVENOUS at 13:32:00

## 2024-05-17 RX ADMIN — EPHEDRINE SULFATE 5 MG: 50 INJECTION, SOLUTION INTRAVENOUS at 15:23:00

## 2024-05-17 RX ADMIN — PHENYLEPHRINE HCL 100 MCG: 10 MG/ML VIAL (ML) INJECTION at 15:27:00

## 2024-05-17 RX ADMIN — EPHEDRINE SULFATE 5 MG: 50 INJECTION, SOLUTION INTRAVENOUS at 13:39:00

## 2024-05-17 RX ADMIN — LIDOCAINE HYDROCHLORIDE 50 MG: 10 INJECTION, SOLUTION EPIDURAL; INFILTRATION; INTRACAUDAL; PERINEURAL at 12:39:00

## 2024-05-17 RX ADMIN — ROCURONIUM BROMIDE 30 MG: 10 INJECTION, SOLUTION INTRAVENOUS at 12:57:00

## 2024-05-17 RX ADMIN — MIDAZOLAM HYDROCHLORIDE 2 MG: 1 INJECTION INTRAMUSCULAR; INTRAVENOUS at 12:37:00

## 2024-05-17 RX ADMIN — ROCURONIUM BROMIDE 10 MG: 10 INJECTION, SOLUTION INTRAVENOUS at 12:40:00

## 2024-05-17 RX ADMIN — EPHEDRINE SULFATE 5 MG: 50 INJECTION, SOLUTION INTRAVENOUS at 15:01:00

## 2024-05-17 RX ADMIN — SODIUM CHLORIDE: 9 INJECTION, SOLUTION INTRAVENOUS at 13:42:00

## 2024-05-17 RX ADMIN — PHENYLEPHRINE HCL 100 MCG: 10 MG/ML VIAL (ML) INJECTION at 15:10:00

## 2024-05-17 RX ADMIN — SODIUM CHLORIDE: 9 INJECTION, SOLUTION INTRAVENOUS at 13:32:00

## 2024-05-17 RX ADMIN — PHENYLEPHRINE HCL 50 MCG: 10 MG/ML VIAL (ML) INJECTION at 15:01:00

## 2024-05-17 NOTE — ANESTHESIA PREPROCEDURE EVALUATION
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) 01/11/2023    Essential hypertension 01/11/2023    Hyperlipidemia 01/11/2023    CAD (coronary artery disease) 01/11/2023    JODI (obstructive sleep apnea) 01/11/2023    BPH (benign prostatic hyperplasia) 01/11/2023    Right rotator cuff tear arthropathy 12/20/2021    Status post right knee replacement 04/17/2018    Muscle cramping 04/17/2018    Right knee arthritis and torn medial  Global 01/16/2018 10/19/2017    Complex tear of medial meniscus of right knee as current injury, initial encounter 09/27/2017    Diabetes mellitus, type 2 ([facility]) 08/29/2017    Diabetic neuropathy associated with type 2 diabetes mellitus ([facility]) 08/29/2017    Prostate CA ([facility]) 08/29/2017    Histoplasmosis 08/29/2017    Urinary leakage 08/29/2017    Left shoulder pain, unspecified chronicity 08/29/2017    Primary osteoarthritis of right knee 08/29/2017    Complete tear of left rotator cuff 08/29/2017    Acute pain of left knee 05/25/2017    LEFT RSA Global 05/28/2019 05/25/2017    VT (ventricular tachycardia) ([facility]) 12/08/2016    Seborrheic keratoses 02/10/2016    Cherry angioma 02/10/2016    Telangiectasia 02/10/2016    Internal derangement of knee 08/17/2015    Thumb pain 08/27/2013       Past Medical History:    Back problem    Coronary atherosclerosis    Diabetes ([facility])    Diabetes type 2,  controlled (HCC)    Esophageal reflux    Hearing impairment    hearing aid    Heart attack (HCC)    silent    High blood pressure    High cholesterol    Histoplasmosis    Neuropathy    Osteoarthritis    Other and unspecified hyperlipidemia    Precordial pain    Prostate CA (HCC)    Hx of seed implants    Unspecified sleep apnea    CPAP    Urinary leakage    Visual impairment    glasses    VT (ventricular tachycardia) ([facility])       Past Surgical History:   Procedure Laterality Date    Hernia surgery Bilateral     inguinal    Other surgical history  1991, 2003    B/L shoulder RCR    Other surgical history      Rt lung surgery lesions exc    Other surgical history      angiograms    Other surgical history      bx and prostate seed implants    Other surgical history Right     dislocation shoulder with reduction    Total knee replacement Right        Medications Prior to Admission   Medication Sig Dispense Refill Last Dose    acetaminophen 500 MG Oral Tab Take 1 tablet (500 mg total) by mouth every 6 (six) hours as needed for Pain.       Multiple Vitamins-Minerals (MULTI-VITAMIN/MINERALS) Oral Tab Take 1 tablet by mouth daily.       cholecalciferol 50 MCG (2000 UT) Oral Tab Take 2 tablets (4,000 Units total) by mouth daily.       atenolol 50 MG Oral Tab Take 0.5 tablets (25 mg total) by mouth at bedtime.  0     Isosorbide Mononitrate ER 60 MG Oral Tablet 24 Hr Take 1 tablet (60 mg total) by mouth daily.       valsartan 160 MG Oral Tab Take 1 tablet (160 mg total) by mouth daily.       nitroGLYCERIN 0.4 MG Sublingual SL Tab Place 1 tablet (0.4 mg total) under the tongue every 5 (five) minutes as needed for Chest pain. (Patient not taking: Reported on 5/8/2024) 30 tablet 1     Probiotic Product (PROBIOTIC ADVANCED OR) Take by mouth daily.         Rosuvastatin Calcium 40 MG Oral Tab Take 0.5 tablets (20 mg total) by mouth nightly.       gabapentin 600 MG Oral Tab Take 1 tablet (600 mg total) by mouth 3 (three) times daily.  Take 600 mg twice daily and 900 mg HS       aspirin 81 MG Oral Tab Take 1 tablet (81 mg total) by mouth daily.       Coenzyme Q10 (CO Q-10) 300 MG Oral Cap Take 300 mg by mouth daily.       ibuprofen 200 MG Oral Tab Take 1 tablet (200 mg total) by mouth every 6 (six) hours as needed for Pain.       Oxybutynin Chloride ER (DITROPAN-XL) 10 MG Oral Take 1 tablet (10 mg total) by mouth daily.       ALFUZOSIN HCL 10 MG OR TB24 Take 1 tablet (10 mg total) by mouth at bedtime.       METFORMIN  MG (MOD) OR TB24 Take 500 tablets by mouth daily.        Current Facility-Administered Medications Ordered in Epic   Medication Dose Route Frequency Provider Last Rate Last Admin    lactated ringers infusion   Intravenous Continuous Jason Palomares MD        acetaminophen (Tylenol Extra Strength) tab 1,000 mg  1,000 mg Oral Once Jason Palomares MD        metoprolol tartrate (Lopressor) tab 25 mg  25 mg Oral Once PRN Jason Palomares MD        ceFAZolin (Ancef) 2g in 10mL IV syringe premix  2 g Intravenous Once Jason Palomares MD         No current [facility]-ordered outpatient medications on file.       Allergies   Allergen Reactions    Amoxicillin RASH    Amoxicillin-Pot Clavulanate RASH    Cholera Toxin B Subunit Recombinant RASH       Family History   Problem Relation Age of Onset    Heart Disorder Father     Cancer Mother     Heart Disorder Brother      Social History     Socioeconomic History    Marital status:    Tobacco Use    Smoking status: Former     Current packs/day: 0.00     Types: Cigarettes     Start date: 1965     Quit date: 1981     Years since quittin.4     Passive exposure: Past    Smokeless tobacco: Never   Vaping Use    Vaping status: Never Used   Substance and Sexual Activity    Alcohol use: No    Drug use: No       Available pre-op labs reviewed.  Lab Results   Component Value Date    WBC 5.4 2024    RBC 5.18 2024    HGB 15.2 2024    HCT 45.5 2024    MCV 87.8  05/08/2024    MCH 29.3 05/08/2024    MCHC 33.4 05/08/2024    RDW 13.5 05/08/2024    .0 05/08/2024     Lab Results   Component Value Date     05/08/2024    K 4.5 05/08/2024     05/08/2024    CO2 31.0 05/08/2024    BUN 13 05/08/2024    CREATSERUM 1.06 05/08/2024     (H) 05/08/2024    CA 9.6 05/08/2024     Lab Results   Component Value Date    INR 1.06 05/08/2024       Vital Signs:  Body mass index is 29.05 kg/m².   height is 1.676 m (5' 6\") and weight is 81.6 kg (180 lb).   Vitals:    05/07/24 0942   Weight: 81.6 kg (180 lb)   Height: 1.676 m (5' 6\")        Anesthesia Evaluation     Patient summary reviewed and Nursing notes reviewed    No history of anesthetic complications   Airway   Mallampati: II  TM distance: >3 FB  Neck ROM: full  Dental    (+) upper dentures and lower dentures    Pulmonary - normal exam   (+) sleep apnea  Cardiovascular - normal exam  (+) hypertension, CAD    ROS comment: EKG SB 1st degree AVB  Stress 24 EF 70%, medium fixed defect    Neuro/Psych      GI/Hepatic/Renal    (+) GERD    Endo/Other    (+) diabetes mellitus  Abdominal                  Anesthesia Plan:   ASA:  3  Plan:   General  Monitors and Lines:   BIS and SSEP/MEP  Airway:  ETT  Post-op Pain Management: IV analgesics  Plan Comments: I have discussed the anesthetic plan, major risks and alternatives with the patient and answered all questions. The patient desires to proceed with surgery and anesthesia as planned.     Informed Consent Plan and Risks Discussed With:  Patient and spouse  Discussed plan with:  CRNA      I have informed Ray Bustillo and/or legal guardian or family member of the nature of the anesthetic plan, benefits, risks including possible dental damage if relevant, major complications, and any alternative forms of anesthetic management.   All of the patient's questions were answered to the best of my ability. The patient desires the anesthetic management as planned.  Kwadwo Govea  MD [place]  5/17/2024 10:28 AM  Present on Admission:  **None**

## 2024-05-17 NOTE — ANESTHESIA PROCEDURE NOTES
Airway  Date/Time: 5/17/2024 12:46 PM  Urgency: elective      General Information and Staff    Patient location during procedure: OR  Resident/CRNA: Lalita Abel CRNA  Performed: CRNA   Performed by: Lalita Abel CRNA  Authorized by: Kwadwo Zhang MD      Indications and Patient Condition  Indications for airway management: anesthesia  Spontaneous ventilation: present  Sedation level: deep  Preoxygenated: yes  Patient position: sniffing  MILS maintained throughout  Mask difficulty assessment: 0 - not attempted  No planned trial extubation    Final Airway Details  Final airway type: endotracheal airway      Successful airway: ETT  Cuffed: yes   Successful intubation technique: direct laryngoscopy  Endotracheal tube insertion site: oral  Blade: Claude  Blade size: #3  ETT size (mm): 7.5    Cormack-Lehane Classification: grade I - full view of glottis  Placement verified by: capnometry   Cuff volume (mL): 7  Measured from: lips  ETT to lips (cm): 21  Number of attempts at approach: 1  Number of other approaches attempted: 0

## 2024-05-17 NOTE — ANESTHESIA PROCEDURE NOTES
Peripheral IV  Date/Time: 5/17/2024 1:13 PM  Inserted by: Lalita Abel CRNA    Placement  Needle size: 20 G  Laterality: right  Location: hand  Local anesthetic: none  Site prep: alcohol  Technique: anatomical landmarks  Attempts: 2

## 2024-05-17 NOTE — BRIEF OP NOTE
Pre-Operative Diagnosis: Lumbar radiculopathy, spinal stenosis lumbar region with neurogenic claudication     Post-Operative Diagnosis: Lumbar radiculopathy, spinal stenosis lumbar region with neurogenic claudication      Procedure Performed:   L4-S1 laminectomy, L4-5 fusion    Surgeons and Role:     * Jason Palomares MD - Primary     * Sohail Bean MD - Assisting Surgeon      Surgical Findings: see op report     Specimen: none     Estimated Blood Loss: Blood Output: 150 mL (5/17/2024  3:30 PM)        German Clarke MD  5/17/2024  4:57 PM

## 2024-05-17 NOTE — PROGRESS NOTES
[facility]  part of [facility]    Progress Note    Ray Bustillo Patient Status:  Outpatient in a Bed    3/31/1945 MRN [id number]   Location [facility] 4W/SW/SE Attending Jason Palomares MD   Hosp Day # 0 PCP Marck Frias MD       Subjective:   Ray Bustillo is a(n) 79 year old male POD# 0 Feels he is doing well. No chest pain, dyspnea or nausea.     Objective:   Vital Signs:  Blood pressure 152/63, pulse 84, temperature 96.9 °F (36.1 °C), temperature source Temporal, resp. rate 12, height 5' 6\" (1.676 m), weight 176 lb (79.8 kg), SpO2 98%.     General: No acute distress. Alert and oriented x 3.  HEENT: Moist mucous membranes. EOM-I. PERRL  Respiratory: Clear to auscultation bilaterally.  No wheezes. No rhonchi.  Cardiovascular: S1, S2.  Regular rate and rhythm.  No murmurs.  Abdomen: Soft, nontender, nondistended.   Neurologic: No focal neurological deficits.  Musculoskeletal: No calf tenderness.   Integument: No lesions. No erythema.  Psychiatric: Appropriate mood and affect.      Results:    Lab Results   Component Value Date    WBC 5.4 2024    HGB 15.2 2024    HCT 45.5 2024    .0 2024    CREATSERUM 1.06 2024    BUN 13 2024     2024    K 4.5 2024     2024    CO2 31.0 2024     (H) 2024    CA 9.6 2024    ALB 4.6 2024    ALKPHO 80 2024    BILT 0.7 2024    TP 7.1 2024    AST 25 2024    ALT 23 2024    PTT 27.4 2024    INR 1.06 2024    PSA 0.08 2023    ESRML 16 2022    CRP <0.29 2022         No results found.        Assessment and Plan:       Lumbar spinal stenosis        S/P lumbar fusion  Stable post op. Routine post op care.       Diabetes mellitus, type 2 (HCC)        Essential hypertension        Hyperlipidemia        CAD (coronary artery disease)        JODI (obstructive sleep  apnea)                Postoperative Recommendations:  Anticoagulation / DVT prophylaxis: SCDs, early ambulation. ASA 81 mg twice daily with food for four weeks.    Pain Control: per ortho  GI protection: Protonix  Incentive Spirometry   Telemetry as needed  CPAP/O2 as needed       Pain management and Physical therapy as per Orthopedic service.   Home Health as needed          Get Hudson MD  5/17/2024

## 2024-05-17 NOTE — H&P
[facility]  part of [facility]    History & Physical    Ray Jamir Bustillo Patient Status:  Outpatient in a Bed    3/31/1945 MRN [id number]   Location [facility] PRE OP RECOVERY Attending Jason Palomares MD   Hosp Day # 0 PCP Marck Frias MD     Date:  2024  Date of Admission:  2024      History:  Past Medical History:    Back problem    Coronary atherosclerosis    Diabetes (HCC)    Diabetes type 2, controlled ([facility])    Esophageal reflux    Hearing impairment    hearing aid    Heart attack ([facility])    silent    High blood pressure    High cholesterol    Histoplasmosis    Neuropathy    Osteoarthritis    Other and unspecified hyperlipidemia    Precordial pain    Prostate CA (HCC)    Hx of seed implants    Unspecified sleep apnea    CPAP    Urinary leakage    Visual impairment    glasses    VT (ventricular tachycardia) ([facility])     Past Surgical History:   Procedure Laterality Date    Hernia surgery Bilateral     inguinal    Other surgical history  ,     B/L shoulder RCR    Other surgical history      Rt lung surgery lesions exc    Other surgical history      angiograms    Other surgical history      bx and prostate seed implants    Other surgical history Right     dislocation shoulder with reduction    Total knee replacement Right      Family History   Problem Relation Age of Onset    Heart Disorder Father     Cancer Mother     Heart Disorder Brother       reports that he quit smoking about 43 years ago. His smoking use included cigarettes. He started smoking about 59 years ago. He has been exposed to tobacco smoke. He has never used smokeless tobacco. He reports that he does not drink alcohol and does not use drugs.    Allergies:  Allergies   Allergen Reactions    Amoxicillin RASH    Amoxicillin-Pot Clavulanate RASH    Cholera Toxin B Subunit Recombinant RASH       Home Medications:  Medications Prior to Admission   Medication Sig Dispense Refill Last Dose     acetaminophen 500 MG Oral Tab Take 1 tablet (500 mg total) by mouth every 6 (six) hours as needed for Pain.   5/16/2024 at 1000    Multiple Vitamins-Minerals (MULTI-VITAMIN/MINERALS) Oral Tab Take 1 tablet by mouth daily.   5/10/2024    cholecalciferol 50 MCG (2000 UT) Oral Tab Take 2 tablets (4,000 Units total) by mouth daily.   5/10/2024    atenolol 50 MG Oral Tab Take 0.5 tablets (25 mg total) by mouth at bedtime.  0 5/16/2024 at 2230    Isosorbide Mononitrate ER 60 MG Oral Tablet 24 Hr Take 1 tablet (60 mg total) by mouth daily.   5/17/2024 at 0830    valsartan 160 MG Oral Tab Take 1 tablet (160 mg total) by mouth daily.   5/16/2024 at 1500    nitroGLYCERIN 0.4 MG Sublingual SL Tab Place 1 tablet (0.4 mg total) under the tongue every 5 (five) minutes as needed for Chest pain. (Patient not taking: Reported on 5/8/2024) 30 tablet 1     Probiotic Product (PROBIOTIC ADVANCED OR) Take by mouth daily.     5/10/2024    Rosuvastatin Calcium 40 MG Oral Tab Take 0.5 tablets (20 mg total) by mouth nightly.   5/15/2024    gabapentin 600 MG Oral Tab Take 1 tablet (600 mg total) by mouth 3 (three) times daily. Take 600 mg twice daily and 900 mg HS   5/17/2024 at 0830    aspirin 81 MG Oral Tab Take 1 tablet (81 mg total) by mouth daily.   5/10/2024    Coenzyme Q10 (CO Q-10) 300 MG Oral Cap Take 300 mg by mouth daily.   5/10/2024    ibuprofen 200 MG Oral Tab Take 1 tablet (200 mg total) by mouth every 6 (six) hours as needed for Pain.   5/10/2024    Oxybutynin Chloride ER (DITROPAN-XL) 10 MG Oral Take 1 tablet (10 mg total) by mouth daily.   5/17/2024 at 0830    ALFUZOSIN HCL 10 MG OR TB24 Take 1 tablet (10 mg total) by mouth at bedtime.   5/16/2024 at 2230    METFORMIN  MG (MOD) OR TB24 Take 500 tablets by mouth daily.   5/16/2024 at 0800       Review of Systems:  12 point ROS reviewed without pertinent positives.     HPI: The patient presents with complaints of low back pain with radiculopathy. The pain radiates from  the low back to the bilateral buttock, posterior thigh, posterolateral legs to the ankles.  He does have peripheral neuropathy diagnosed 10-15 years ago in the bilateral feet/toes to the ankle.  He also has more recent pain that started in the left greater than right anterior thigh and lateral hips.  He denies weakness in these areas. They deny prior lumbar spine surgery.  They deny current fevers, chills, infection, rashes/bruises on the body, gross bowel/bladder incontinence or saddle anesthesia. His recent lumbar MRI was from 1/2024.     Physical Exam:  The patient is well developed, no acute distress, alert and oriented x3, skin intact to the posterior lumbar without erythema or edema, motor exam with 4/5 EHL/tib ant on the left, otherwise 5/5 bilateral lower extremities, calves soft and non tender, 2+DP      Assessment:  Patient Active Problem List   Diagnosis    Thumb pain    Internal derangement of knee    Seborrheic keratoses    Cherry angioma    Telangiectasia    VT (ventricular tachycardia) ([facility])    Acute pain of left knee    LEFT RSA Global 05/28/2019    Diabetes mellitus, type 2 ([facility])    Diabetic neuropathy associated with type 2 diabetes mellitus (HCC)    Prostate CA (HCC)    Histoplasmosis    Urinary leakage    Left shoulder pain, unspecified chronicity    Primary osteoarthritis of right knee    Complete tear of left rotator cuff    Complex tear of medial meniscus of right knee as current injury, initial encounter    Right knee arthritis and torn medial  Global 01/16/2018    Status post right knee replacement    Muscle cramping    Right rotator cuff tear arthropathy    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

## 2024-05-18 ENCOUNTER — APPOINTMENT (OUTPATIENT)
Dept: GENERAL RADIOLOGY | Facility: HOSPITAL | Age: 79
DRG: 460 | End: 2024-05-18
Attending: PHYSICIAN ASSISTANT

## 2024-05-18 LAB
DEPRECATED RDW RBC AUTO: 44.7 FL (ref 35.1–46.3)
ERYTHROCYTE [DISTWIDTH] IN BLOOD BY AUTOMATED COUNT: 13.8 % (ref 11–15)
GLUCOSE BLDC GLUCOMTR-MCNC: 120 MG/DL (ref 70–99)
GLUCOSE BLDC GLUCOMTR-MCNC: 152 MG/DL (ref 70–99)
GLUCOSE BLDC GLUCOMTR-MCNC: 184 MG/DL (ref 70–99)
GLUCOSE BLDC GLUCOMTR-MCNC: 211 MG/DL (ref 70–99)
HCT VFR BLD AUTO: 38.2 %
HGB BLD-MCNC: 12.7 G/DL
MCH RBC QN AUTO: 29.3 PG (ref 26–34)
MCHC RBC AUTO-ENTMCNC: 33.2 G/DL (ref 31–37)
MCV RBC AUTO: 88.2 FL
PLATELET # BLD AUTO: 158 10(3)UL (ref 150–450)
RBC # BLD AUTO: 4.33 X10(6)UL
WBC # BLD AUTO: 10.5 X10(3) UL (ref 4–11)

## 2024-05-18 PROCEDURE — 97165 OT EVAL LOW COMPLEX 30 MIN: CPT

## 2024-05-18 PROCEDURE — 97162 PT EVAL MOD COMPLEX 30 MIN: CPT

## 2024-05-18 PROCEDURE — 72100 X-RAY EXAM L-S SPINE 2/3 VWS: CPT | Performed by: PHYSICIAN ASSISTANT

## 2024-05-18 PROCEDURE — 94799 UNLISTED PULMONARY SVC/PX: CPT

## 2024-05-18 PROCEDURE — 97535 SELF CARE MNGMENT TRAINING: CPT

## 2024-05-18 PROCEDURE — 85027 COMPLETE CBC AUTOMATED: CPT | Performed by: PHYSICIAN ASSISTANT

## 2024-05-18 PROCEDURE — 82962 GLUCOSE BLOOD TEST: CPT

## 2024-05-18 RX ORDER — TRAMADOL HYDROCHLORIDE 50 MG/1
100 TABLET ORAL EVERY 6 HOURS PRN
Status: DISCONTINUED | OUTPATIENT
Start: 2024-05-18 | End: 2024-05-21

## 2024-05-18 RX ORDER — TRAMADOL HYDROCHLORIDE 50 MG/1
50 TABLET ORAL EVERY 6 HOURS PRN
Status: DISCONTINUED | OUTPATIENT
Start: 2024-05-18 | End: 2024-05-18

## 2024-05-18 RX ORDER — HYDRALAZINE HYDROCHLORIDE 25 MG/1
25 TABLET, FILM COATED ORAL 3 TIMES DAILY PRN
Status: DISCONTINUED | OUTPATIENT
Start: 2024-05-18 | End: 2024-05-21

## 2024-05-18 NOTE — PROGRESS NOTES
Spine Surgery Progress Note     Interval history     Afebrile vital signs stable     Incisional pain    Resolution of radicular pain    Objective   Lab Results   Component Value Date    WBC 10.5 05/18/2024    HGB 12.7 05/18/2024    HCT 38.2 05/18/2024    .0 05/18/2024        Vitals:    05/18/24 0852   BP: 143/57   Pulse: 62   Resp: 20   Temp: 98 °F (36.7 °C)       Examination     D  B WE T FF HI      IP Q TA EHL GS  R  - - - - - -   5 5 5 5 5    L  - - - - - -   5 5 4 4 5    Sensation intact to light touch L2-S1 dermatomes bilateral    Drain to suction with 270/150 SS output     Assessment  POD 1 from L4-1 PLD with L4/5 PSF    Plan   -DVT ppx with SCD, stockings, and ambulation  -continue PT/OT eval and treatment  -postoperative radiograph  -regular diet   -pain control  -progress toward discharge

## 2024-05-18 NOTE — PLAN OF CARE
Alert and oriented x4. Admitted for laminectomy.  POD 1. 1 assist walker. Voiding. Hemovac in place. Gel ice. ACHS. RA. CPAP at night. Tele. Pain control. Plan for home once cleared  Problem: Patient Centered Care  Goal: Patient preferences are identified and integrated in the patient's plan of care  Description: Interventions:  - What would you like us to know as we care for you? Home with spouse  - Provide timely, complete, and accurate information to patient/family  - Incorporate patient and family knowledge, values, beliefs, and cultural backgrounds into the planning and delivery of care  - Encourage patient/family to participate in care and decision-making at the level they choose  - Honor patient and family perspectives and choices  Outcome: Progressing     Problem: Patient/Family Goals  Goal: Patient/Family Long Term Goal  Description: Patient's Long Term Goal: pain management    Interventions:  - pain administration  - See additional Care Plan goals for specific interventions  Outcome: Progressing  Goal: Patient/Family Short Term Goal  Description: Patient's Short Term Goal: calling for assistance out of bed    Interventions:   - safe ambulation    - See additional Care Plan goals for specific interventions  Outcome: Progressing     Problem: Diabetes/Glucose Control  Goal: Glucose maintained within prescribed range  Description: INTERVENTIONS:  - Monitor Blood Glucose as ordered  - Assess for signs and symptoms of hyperglycemia and hypoglycemia  - Administer ordered medications to maintain glucose within target range  - Assess barriers to adequate nutritional intake and initiate nutrition consult as needed  - Instruct patient on self management of diabetes  Outcome: Progressing     Problem: PAIN - ADULT  Goal: Verbalizes/displays adequate comfort level or patient's stated pain goal  Description: INTERVENTIONS:  - Encourage pt to monitor pain and request assistance  - Assess pain using appropriate pain  scale  - Administer analgesics based on type and severity of pain and evaluate response  - Implement non-pharmacological measures as appropriate and evaluate response  - Consider cultural and social influences on pain and pain management  - Manage/alleviate anxiety  - Utilize distraction and/or relaxation techniques  - Monitor for opioid side effects  - Notify MD/LIP if interventions unsuccessful or patient reports new pain  - Anticipate increased pain with activity and pre-medicate as appropriate  Outcome: Progressing     Problem: RISK FOR INFECTION - ADULT  Goal: Absence of fever/infection during anticipated neutropenic period  Description: INTERVENTIONS  - Monitor WBC  - Administer growth factors as ordered  - Implement neutropenic guidelines  Outcome: Progressing     Problem: SAFETY ADULT - FALL  Goal: Free from fall injury  Description: INTERVENTIONS:  - Assess pt frequently for physical needs  - Identify cognitive and physical deficits and behaviors that affect risk of falls.  - [place] fall precautions as indicated by assessment.  - Educate pt/family on patient safety including physical limitations  - Instruct pt to call for assistance with activity based on assessment  - Modify environment to reduce risk of injury  - Provide assistive devices as appropriate  - Consider OT/PT consult to assist with strengthening/mobility  - Encourage toileting schedule  Outcome: Progressing     Problem: DISCHARGE PLANNING  Goal: Discharge to home or other facility with appropriate resources  Description: INTERVENTIONS:  - Identify barriers to discharge w/pt and caregiver  - Include patient/family/discharge partner in discharge planning  - Arrange for needed discharge resources and transportation as appropriate  - Identify discharge learning needs (meds, wound care, etc)  - Arrange for interpreters to assist at discharge as needed  - Consider post-discharge preferences of patient/family/discharge partner  - Complete POLST  form as appropriate  - Assess patient's ability to be responsible for managing their own health  - Refer to Case Management Department for coordinating discharge planning if the patient needs post-hospital services based on physician/LIP order or complex needs related to functional status, cognitive ability or social support system  Outcome: Progressing     Problem: GENITOURINARY - ADULT  Goal: Absence of urinary retention  Description: INTERVENTIONS:  - Assess patient’s ability to void and empty bladder  - Monitor intake/output and perform bladder scan as needed  - Follow urinary retention protocol/standard of care  - Consider collaborating with pharmacy to review patient's medication profile  - Implement strategies to promote bladder emptying  Outcome: Progressing     Problem: SKIN/TISSUE INTEGRITY - ADULT  Goal: Skin integrity remains intact  Description: INTERVENTIONS  - Assess and document risk factors for pressure ulcer development  - Assess and document skin integrity  - Monitor for areas of redness and/or skin breakdown  - Initiate interventions, skin care algorithm/standards of care as needed  Outcome: Progressing  Goal: Incision(s), wounds(s) or drain site(s) healing without S/S of infection  Description: INTERVENTIONS:  - Assess and document risk factors for pressure ulcer development  - Assess and document skin integrity  - Assess and document dressing/incision, wound bed, drain sites and surrounding tissue  - Implement wound care per orders  - Initiate isolation precautions as appropriate  - Initiate Pressure Ulcer prevention bundle as indicated  Outcome: Progressing     Problem: MUSCULOSKELETAL - ADULT  Goal: Return mobility to safest level of function  Description: INTERVENTIONS:  - Assess patient stability and activity tolerance for standing, transferring and ambulating w/ or w/o assistive devices  - Assist with transfers and ambulation using safe patient handling equipment as needed  - Ensure  adequate protection for wounds/incisions during mobilization  - Obtain PT/OT consults as needed  - Advance activity as appropriate  - Communicate ordered activity level and limitations with patient/family  Outcome: Progressing  Goal: Maintain proper alignment of affected body part  Description: INTERVENTIONS:  - Support and protect limb and body alignment per provider's orders  - Instruct and reinforce with patient and family use of appropriate assistive device and precautions (e.g. spinal or hip dislocation precautions)  Outcome: Progressing     Problem: Impaired Functional Mobility  Goal: Achieve highest/safest level of mobility/gait  Description: Interventions:  - Assess patient's functional ability and stability  - Promote increasing activity/tolerance for mobility and gait  - Educate and engage patient/family in tolerated activity level and precautions    Outcome: Progressing     Problem: Impaired Activities of Daily Living  Goal: Achieve highest/safest level of independence in self care  Description: Interventions:  - Assess ability and encourage patient to participate in ADLs to maximize function  - Promote sitting position while performing ADLs such as feeding, grooming, and bathing  - Educate and encourage patient/family in tolerated functional activity level and precautions during self-care    Outcome: Progressing

## 2024-05-18 NOTE — PHYSICAL THERAPY NOTE
PHYSICAL THERAPY EVALUATION - INPATIENT     Room Number: 408/408-A  Evaluation Date: 5/18/2024  Type of Evaluation: Initial        Presenting Problem: s/p L4-S1 lami, L4-5 fusion     Reason for Therapy: Mobility Dysfunction and Discharge Planning    PHYSICAL THERAPY ASSESSMENT   Patient is a 79 year old male admitted 5/17/2024 s/p L4-S1 lami, L4-5 fusion.  Upon PT evaluation, pt was able to ambulate and perform transfers at CGA-min A level with use of RW.  He ambulated up to bathroom from chair and back to bed.  Prior to admission, patient's baseline is independent with use of straight cane.  Patient is currently functioning below baseline with bed mobility, transfers, gait, and stair negotiation.  Patient is requiring contact guard assist and minimal assist as a result of the following impairments: decreased functional strength, pain, impaired   balance, decreased muscular endurance, and limited lumbar ROM.  Physical Therapy will continue to follow for duration of hospitalization. Patient will benefit from continued skilled PT Services at discharge to promote functional independence in home.  Anticipate patient will return home with home health PT vs short rehab stay pending progress.    PLAN  PT Treatment Plan: Bed mobility;Patient education;Gait training;Body mechanics;Stair training;Transfer training  Rehab Potential : Good  Frequency (Obs): Daily    PHYSICAL THERAPY MEDICAL/SOCIAL HISTORY     Problem List  Principal Problem:    Lumbar spinal stenosis  Active Problems:    Diabetes mellitus, type 2 (HCC)    Essential hypertension    Hyperlipidemia    CAD (coronary artery disease)    JODI (obstructive sleep apnea)    S/P lumbar fusion      HOME SITUATION  Home Situation  Type of Home: House  Stairs to Enter : 2  Railing: Yes  Stairs to Bedroom: 14  Railing: Yes  Lives With: Spouse     Prior Level of [place]:   Ind ambulator with use of straight cane        PHYSICAL THERAPY EXAMINATION   OBJECTIVE  Precautions:  Spine;Drain(s)  Fall Risk: Standard fall risk    WEIGHT BEARING RESTRICTION   none             PAIN ASSESSMENT  Rating:  (did not rate)  Location: low back  Management Techniques: Activity promotion    COGNITION  Overall Cognitive Status:  WFL - within functional limits    RANGE OF MOTION AND STRENGTH ASSESSMENT  Upper extremity ROM and strength are within functional limits   Lower extremity ROM is within functional limits   Lower extremity strength is within functional limits            AM-PAC '6-Clicks' INPATIENT SHORT FORM - BASIC MOBILITY  How much difficulty does the patient currently have...  Patient Difficulty: Turning over in bed (including adjusting bedclothes, sheets and blankets)?: None   Patient Difficulty: Sitting down on and standing up from a chair with arms (e.g., wheelchair, bedside commode, etc.): A Little   Patient Difficulty: Moving from lying on back to sitting on the side of the bed?: None   How much help from another person does the patient currently need...   Help from Another: Moving to and from a bed to a chair (including a wheelchair)?: A Little   Help from Another: Need to walk in hospital room?: A Little   Help from Another: Climbing 3-5 steps with a railing?: A Little     AM-PAC Score:  Raw Score: 20   Approx Degree of Impairment: 35.83%   Standardized Score (AM-PAC Scale): 47.67   CMS Modifier (G-Code): CJ    FUNCTIONAL ABILITY STATUS  Functional Mobility/Gait Assessment  Gait Assistance: Contact guard assist  Distance (ft): 25  Assistive Device: Rolling walker  Rolling: stand-by assist  Sit to sidelying to supine: stand-by assist  Sit to Stand: minimal assist    Exercise/Education Provided:  Bed mobility  Body mechanics  Functional activity tolerated  Gait training  Transfer training    The patient's Approx Degree of Impairment: 35.83% has been calculated based on documentation in the [facility] '6 clicks' Inpatient Basic Mobility Short Form.  Research supports that patients with this  level of impairment may benefit from home with home PT vs short rehab stay pending progress.  Final disposition will be made by interdisciplinary medical team.    Patient End of Session: In bed;Needs met;Call light within reach;RN aware of session/findings;With  staff    CURRENT GOALS  Goals to be met by:   Patient Goal Patient's self-stated goal is:    Goal #1 Patient will perform log roll technique at mod I level with performance of supine<>sit   Goal #1   Current Status    Goal #2 Patient is able to demonstrate transfers Sit to/from Stand at assistance level: minimum assistance with walker - rolling     Goal #2  Current Status    Goal #3 Patient is able to ambulate >150 feet with assist device: walker - rolling at assistance level: modified independent   Goal #3   Current Status    Goal #4 Patient will negotiate 2 stairs w/ assistive device and supervision   Goal #4   Current Status    Goal #5 Patient to demonstrate independence with home activity/exercise instructions provided to patient in preparation for discharge.   Goal #5   Current Status    Goal #6    Goal #6  Current Status      Patient Evaluation Complexity Level:  History High - 3 or more personal factors and/or co-morbidities   Examination of body systems Low -  addressing 1-2 elements   Clinical Presentation Low- Stable   Clinical Decision Making  Moderate Complexity     PT evaluation: 25 minutes   Yes

## 2024-05-18 NOTE — PROGRESS NOTES
[facility]  part of [facility]    Progress Note    Ray Bustillo Patient Status:  Outpatient in a Bed    3/31/1945 MRN [id number]   Location [facility] 4W/SW/SE Attending Jason Palomares MD   Hosp Day # 0 PCP Marck Frias MD       Subjective:   Ray Bustillo is a(n) 79 year old male POD# 1 Feels he is doing well. No chest pain, dyspnea or nausea.     Objective:   Vital Signs:  Blood pressure 143/57, pulse 62, temperature 98 °F (36.7 °C), temperature source Oral, resp. rate 20, height 5' 6\" (1.676 m), weight 176 lb (79.8 kg), SpO2 97%.     General: No acute distress. Alert and oriented x 3.  HEENT: Moist mucous membranes. EOM-I. PERRL  Neck: No lymphadenopathy.  No JVD. No carotid bruits.  Respiratory: Clear to auscultation bilaterally.  No wheezes. No rhonchi.  Cardiovascular: S1, S2.  Regular rate and rhythm.  No murmurs.  Abdomen: Soft, nontender, nondistended.   Neurologic: No focal neurological deficits.  Musculoskeletal: No calf tenderness.   Integument: No lesions. No erythema.  Psychiatric: Appropriate mood and affect.      Results:    Lab Results   Component Value Date    WBC 10.5 2024    HGB 12.7 (L) 2024    HCT 38.2 (L) 2024    .0 2024    CREATSERUM 1.06 2024    BUN 13 2024     2024    K 4.5 2024     2024    CO2 31.0 2024     (H) 2024    CA 9.6 2024    ALB 4.6 2024    ALKPHO 80 2024    BILT 0.7 2024    TP 7.1 2024    AST 25 2024    ALT 23 2024    PTT 27.4 2024    INR 1.06 2024    PSA 0.08 2023    ESRML 16 2022    CRP <0.29 2022         No results found.        Assessment and Plan:       Lumbar spinal stenosis        S/P lumbar fusion  Stable pod 1      Diabetes mellitus, type 2 (HCC)        Essential hypertension        Hyperlipidemia        CAD (coronary artery disease)        JODI (obstructive  sleep apnea)                Postoperative Recommendations:  Anticoagulation / DVT prophylaxis: SCDs, early ambulation  Pain Control: per ortho  GI protection: Protonix  Incentive Spirometry   Telemetry as needed      Pain management and Physical therapy as per Orthopedic service.   Home Health as needed          Get Hudson MD  5/18/2024

## 2024-05-18 NOTE — PLAN OF CARE
Patient A&Ox4. Post-op day #0. Dressing in place to mid lower back. Hemovac in place. Monitoring vital signs. Receiving IV fluids per MD order. Monitoring blood glucose levels per order. Tolerating diet. Voiding freely, difficulty starting stream due to hx of prostate cancer. Patient is on 1L/min of oxygen via nasal cannula. Hx of JODI, uses CPAP @ night. SCDs for DVT prophylaxis. Pain medication provided as needed. Up with standby assist and a walker. Encouraged frequent ambulation and use of incentive spirometer. Fall precautions maintained- bed alarm on, bed locked in lowest position, call light and personal belongings within reach, non-skid socks in place to bilateral feet. Frequent rounding by nursing staff. Plan TBD.       Problem: Patient Centered Care  Goal: Patient preferences are identified and integrated in the patient's plan of care  Description: Interventions:  - What would you like us to know as we care for you?   - Provide timely, complete, and accurate information to patient/family  - Incorporate patient and family knowledge, values, beliefs, and cultural backgrounds into the planning and delivery of care  - Encourage patient/family to participate in care and decision-making at the level they choose  - Honor patient and family perspectives and choices  Outcome: Progressing     Problem: Diabetes/Glucose Control  Goal: Glucose maintained within prescribed range  Description: INTERVENTIONS:  - Monitor Blood Glucose as ordered  - Assess for signs and symptoms of hyperglycemia and hypoglycemia  - Administer ordered medications to maintain glucose within target range  - Assess barriers to adequate nutritional intake and initiate nutrition consult as needed  - Instruct patient on self management of diabetes  Outcome: Progressing     Problem: PAIN - ADULT  Goal: Verbalizes/displays adequate comfort level or patient's stated pain goal  Description: INTERVENTIONS:  - Encourage pt to monitor pain and request  assistance  - Assess pain using appropriate pain scale  - Administer analgesics based on type and severity of pain and evaluate response  - Implement non-pharmacological measures as appropriate and evaluate response  - Consider cultural and social influences on pain and pain management  - Manage/alleviate anxiety  - Utilize distraction and/or relaxation techniques  - Monitor for opioid side effects  - Notify MD/LIP if interventions unsuccessful or patient reports new pain  - Anticipate increased pain with activity and pre-medicate as appropriate  Outcome: Progressing     Problem: RISK FOR INFECTION - ADULT  Goal: Absence of fever/infection during anticipated neutropenic period  Description: INTERVENTIONS  - Monitor WBC  - Administer growth factors as ordered  - Implement neutropenic guidelines  Outcome: Progressing     Problem: SAFETY ADULT - FALL  Goal: Free from fall injury  Description: INTERVENTIONS:  - Assess pt frequently for physical needs  - Identify cognitive and physical deficits and behaviors that affect risk of falls.  - [place] fall precautions as indicated by assessment.  - Educate pt/family on patient safety including physical limitations  - Instruct pt to call for assistance with activity based on assessment  - Modify environment to reduce risk of injury  - Provide assistive devices as appropriate  - Consider OT/PT consult to assist with strengthening/mobility  - Encourage toileting schedule  Outcome: Progressing     Problem: DISCHARGE PLANNING  Goal: Discharge to home or other facility with appropriate resources  Description: INTERVENTIONS:  - Identify barriers to discharge w/pt and caregiver  - Include patient/family/discharge partner in discharge planning  - Arrange for needed discharge resources and transportation as appropriate  - Identify discharge learning needs (meds, wound care, etc)  - Arrange for interpreters to assist at discharge as needed  - Consider post-discharge preferences of  patient/family/discharge partner  - Complete POLST form as appropriate  - Assess patient's ability to be responsible for managing their own health  - Refer to Case Management Department for coordinating discharge planning if the patient needs post-hospital services based on physician/LIP order or complex needs related to functional status, cognitive ability or social support system  Outcome: Progressing     Problem: GENITOURINARY - ADULT  Goal: Absence of urinary retention  Description: INTERVENTIONS:  - Assess patient’s ability to void and empty bladder  - Monitor intake/output and perform bladder scan as needed  - Follow urinary retention protocol/standard of care  - Consider collaborating with pharmacy to review patient's medication profile  - Implement strategies to promote bladder emptying  Outcome: Progressing     Problem: SKIN/TISSUE INTEGRITY - ADULT  Goal: Skin integrity remains intact  Description: INTERVENTIONS  - Assess and document risk factors for pressure ulcer development  - Assess and document skin integrity  - Monitor for areas of redness and/or skin breakdown  - Initiate interventions, skin care algorithm/standards of care as needed  Outcome: Progressing  Goal: Incision(s), wounds(s) or drain site(s) healing without S/S of infection  Description: INTERVENTIONS:  - Assess and document risk factors for pressure ulcer development  - Assess and document skin integrity  - Assess and document dressing/incision, wound bed, drain sites and surrounding tissue  - Implement wound care per orders  - Initiate isolation precautions as appropriate  - Initiate Pressure Ulcer prevention bundle as indicated  Outcome: Progressing     Problem: MUSCULOSKELETAL - ADULT  Goal: Return mobility to safest level of function  Description: INTERVENTIONS:  - Assess patient stability and activity tolerance for standing, transferring and ambulating w/ or w/o assistive devices  - Assist with transfers and ambulation using safe  patient handling equipment as needed  - Ensure adequate protection for wounds/incisions during mobilization  - Obtain PT/OT consults as needed  - Advance activity as appropriate  - Communicate ordered activity level and limitations with patient/family  Outcome: Progressing  Goal: Maintain proper alignment of affected body part  Description: INTERVENTIONS:  - Support and protect limb and body alignment per provider's orders  - Instruct and reinforce with patient and family use of appropriate assistive device and precautions (e.g. spinal or hip dislocation precautions)  Outcome: Progressing     Problem: Impaired Functional Mobility  Goal: Achieve highest/safest level of mobility/gait  Description: Interventions:  - Assess patient's functional ability and stability  - Promote increasing activity/tolerance for mobility and gait  - Educate and engage patient/family in tolerated activity level and precautions  Outcome: Progressing     Problem: Impaired Activities of Daily Living  Goal: Achieve highest/safest level of independence in self care  Description: Interventions:  - Assess ability and encourage patient to participate in ADLs to maximize function  - Promote sitting position while performing ADLs such as feeding, grooming, and bathing  - Educate and encourage patient/family in tolerated functional activity level and precautions during self-care  Outcome: Progressing

## 2024-05-18 NOTE — PLAN OF CARE
Alert and oriented times 4. Has pain being controlled by PRN oxy and muscle relaxer. Ambulate times a assist to the bathroom. Voiding freely. Uses call light appropriately. Call light within reach to the patient       Problem: Diabetes/Glucose Control  Goal: Glucose maintained within prescribed range  Description: INTERVENTIONS:  - Monitor Blood Glucose as ordered  - Assess for signs and symptoms of hyperglycemia and hypoglycemia  - Administer ordered medications to maintain glucose within target range  - Assess barriers to adequate nutritional intake and initiate nutrition consult as needed  - Instruct patient on self management of diabetes  Outcome: Progressing     Problem: PAIN - ADULT  Goal: Verbalizes/displays adequate comfort level or patient's stated pain goal  Description: INTERVENTIONS:  - Encourage pt to monitor pain and request assistance  - Assess pain using appropriate pain scale  - Administer analgesics based on type and severity of pain and evaluate response  - Implement non-pharmacological measures as appropriate and evaluate response  - Consider cultural and social influences on pain and pain management  - Manage/alleviate anxiety  - Utilize distraction and/or relaxation techniques  - Monitor for opioid side effects  - Notify MD/LIP if interventions unsuccessful or patient reports new pain  - Anticipate increased pain with activity and pre-medicate as appropriate  Outcome: Progressing     Problem: RISK FOR INFECTION - ADULT  Goal: Absence of fever/infection during anticipated neutropenic period  Description: INTERVENTIONS  - Monitor WBC  - Administer growth factors as ordered  - Implement neutropenic guidelines  Outcome: Progressing     Problem: SAFETY ADULT - FALL  Goal: Free from fall injury  Description: INTERVENTIONS:  - Assess pt frequently for physical needs  - Identify cognitive and physical deficits and behaviors that affect risk of falls.  - [place] fall precautions as indicated by  assessment.  - Educate pt/family on patient safety including physical limitations  - Instruct pt to call for assistance with activity based on assessment  - Modify environment to reduce risk of injury  - Provide assistive devices as appropriate  - Consider OT/PT consult to assist with strengthening/mobility  - Encourage toileting schedule  Outcome: Progressing     Problem: DISCHARGE PLANNING  Goal: Discharge to home or other facility with appropriate resources  Description: INTERVENTIONS:  - Identify barriers to discharge w/pt and caregiver  - Include patient/family/discharge partner in discharge planning  - Arrange for needed discharge resources and transportation as appropriate  - Identify discharge learning needs (meds, wound care, etc)  - Arrange for interpreters to assist at discharge as needed  - Consider post-discharge preferences of patient/family/discharge partner  - Complete POLST form as appropriate  - Assess patient's ability to be responsible for managing their own health  - Refer to Case Management Department for coordinating discharge planning if the patient needs post-hospital services based on physician/LIP order or complex needs related to functional status, cognitive ability or social support system  Outcome: Progressing     Problem: GENITOURINARY - ADULT  Goal: Absence of urinary retention  Description: INTERVENTIONS:  - Assess patient’s ability to void and empty bladder  - Monitor intake/output and perform bladder scan as needed  - Follow urinary retention protocol/standard of care  - Consider collaborating with pharmacy to review patient's medication profile  - Implement strategies to promote bladder emptying  Outcome: Progressing     Problem: SKIN/TISSUE INTEGRITY - ADULT  Goal: Skin integrity remains intact  Description: INTERVENTIONS  - Assess and document risk factors for pressure ulcer development  - Assess and document skin integrity  - Monitor for areas of redness and/or skin  breakdown  - Initiate interventions, skin care algorithm/standards of care as needed  Outcome: Progressing  Goal: Incision(s), wounds(s) or drain site(s) healing without S/S of infection  Description: INTERVENTIONS:  - Assess and document risk factors for pressure ulcer development  - Assess and document skin integrity  - Assess and document dressing/incision, wound bed, drain sites and surrounding tissue  - Implement wound care per orders  - Initiate isolation precautions as appropriate  - Initiate Pressure Ulcer prevention bundle as indicated  Outcome: Progressing     Problem: MUSCULOSKELETAL - ADULT  Goal: Return mobility to safest level of function  Description: INTERVENTIONS:  - Assess patient stability and activity tolerance for standing, transferring and ambulating w/ or w/o assistive devices  - Assist with transfers and ambulation using safe patient handling equipment as needed  - Ensure adequate protection for wounds/incisions during mobilization  - Obtain PT/OT consults as needed  - Advance activity as appropriate  - Communicate ordered activity level and limitations with patient/family  Outcome: Progressing  Goal: Maintain proper alignment of affected body part  Description: INTERVENTIONS:  - Support and protect limb and body alignment per provider's orders  - Instruct and reinforce with patient and family use of appropriate assistive device and precautions (e.g. spinal or hip dislocation precautions)  Outcome: Progressing

## 2024-05-18 NOTE — OCCUPATIONAL THERAPY NOTE
OCCUPATIONAL THERAPY EVALUATION - INPATIENT     Room Number: 408/408-A  Evaluation Date: 5/18/2024  Type of Evaluation: Initial       Physician Order: IP Consult to Occupational Therapy  Reason for Therapy: ADL/IADL Dysfunction and Discharge Planning    OCCUPATIONAL THERAPY ASSESSMENT     Patient is a 79 year old male admitted 5/17/2024 for L4-1 PLD with L4/5 PSF.  Prior to admission, pt was independent.  Patient is currently requiring up to mod A for ADLs overall along with min A for supine to sit, sup for sitting at EOB, sup for STS, and sup for functional transfer at RW level and min A for sit to supine. Pt tolerated about 2 minutes of standing in unsupported standing.       RN cleared pt for participation in OT session. Upon arrival, pt was supine in bed and agreeable to activity. Wife present during session. Gait belt used. Pt was left in bed. Call light and all needs left in reach. Handoff given to RN.    Education provided   Educated pt about role of OT and POC. Reviewed spinal precautions with pt. Pt able to recall 3/3 precautions. Educated pt about adherence to spinal precautions during ADLs and functional transfers as well as log rolling techniques, sleeping positions, and use of ice.    . Pt verbalized/demonstrated good carryover.    OT to sign off. Pt to DC home with wife assist PRN.      OCCUPATIONAL THERAPY MEDICAL/SOCIAL HISTORY     Problem List  Principal Problem:    Lumbar spinal stenosis  Active Problems:    Diabetes mellitus, type 2 (HCC)    Essential hypertension    Hyperlipidemia    CAD (coronary artery disease)    JODI (obstructive sleep apnea)    S/P lumbar fusion      Past Medical History  Past Medical History:    Back problem    Coronary atherosclerosis    Diabetes (HCC)    Diabetes type 2, controlled (HCC)    Esophageal reflux    Hearing impairment    hearing aid    Heart attack (HCC)    silent    High blood pressure    High cholesterol    Histoplasmosis    Neuropathy    Osteoarthritis     Other and unspecified hyperlipidemia    Precordial pain    Prostate CA (HCC)    Hx of seed implants    Unspecified sleep apnea    CPAP    Urinary leakage    Visual impairment    glasses    VT (ventricular tachycardia) (HCC)       Past Surgical History  Past Surgical History:   Procedure Laterality Date    Hernia surgery Bilateral     inguinal    Other surgical history  ,     B/L shoulder RCR    Other surgical history      Rt lung surgery lesions exc    Other surgical history      angiograms    Other surgical history      bx and prostate seed implants    Other surgical history Right     dislocation shoulder with reduction    Total knee replacement Right     Total shoulder arthroplasty Left     x2       HOME SITUATION  Type of Home: House     Lives With: Spouse                              SUBJECTIVE  \"I have to use the bathroom.\"  OCCUPATIONAL THERAPY EXAMINATION      OBJECTIVE  Precautions: Spine;Drain(s)  Fall Risk: Standard fall risk    PAIN ASSESSMENT  Ratin  Location: back  Management Techniques: Ice       RANGE OF MOTION   Upper extremity ROM is within functional limits     STRENGTH ASSESSMENT  Upper extremity strength is within functional limits     COORDINATION  Gross Motor: WFL   Fine Motor: WFL     ACTIVITIES OF DAILY LIVING ASSESSMENT  AM-PAC ‘6-Clicks’ Inpatient Daily Activity Short Form  How much help from another person does the patient currently need…  -   Putting on and taking off regular lower body clothing?: A Little  -   Bathing (including washing, rinsing, drying)?: A Little  -   Toileting, which includes using toilet, bedpan or urinal? : None  -   Putting on and taking off regular upper body clothing?: None  -   Taking care of personal grooming such as brushing teeth?: None  -   Eating meals?: None    AM-PAC Score:  Score: 22  Approx Degree of Impairment: 25.8%  Standardized Score (AM-PAC Scale): 47.1  CMS Modifier (G-Code): CJ      BED MOBILITY  Supine to Sit : Minimal Assist  Sit to  Supine (OT): Minimal Assist      FUNCTIONAL TRANSFER ASSESSMENT  Supine to Sit : Minimal Assist     Sit to stand: sup  Toilet Transfer: sup  Chair Transfer: sup    FUNCTIONAL ADL ASSESSMENT  Overall mod A    The patient's Approx Degree of Impairment: 25.8% has been calculated based on documentation in the [facility] '6 clicks' Inpatient Daily Activity Short Form.  Research supports that patients with this level of impairment may benefit from home. Final disposition will be made by interdisciplinary medical team.         Patient Evaluation Complexity Level:   Occupational Profile/Medical History LOW - Brief history including review of medical or therapy records    Specific performance deficits impacting engagement in ADL/IADL LOW  1 - 3 performance deficits    Client Assessment/Performance Deficits LOW - No comorbidities nor modifications of tasks    Clinical Decision Making LOW - Analysis of occupational profile, problem-focused assessments, limited treatment options    Overall Complexity LOW     OT Session Time: 20 minutes  Self-Care Home Management: 10 minutes           Billie Zuniga OT  [facility]  Inpatient Rehabilitation  Occupational Therapy  [phone]

## 2024-05-19 LAB
GLUCOSE BLDC GLUCOMTR-MCNC: 136 MG/DL (ref 70–99)
GLUCOSE BLDC GLUCOMTR-MCNC: 156 MG/DL (ref 70–99)
GLUCOSE BLDC GLUCOMTR-MCNC: 185 MG/DL (ref 70–99)
GLUCOSE BLDC GLUCOMTR-MCNC: 281 MG/DL (ref 70–99)

## 2024-05-19 PROCEDURE — 97116 GAIT TRAINING THERAPY: CPT

## 2024-05-19 PROCEDURE — 82962 GLUCOSE BLOOD TEST: CPT

## 2024-05-19 PROCEDURE — 97530 THERAPEUTIC ACTIVITIES: CPT

## 2024-05-19 PROCEDURE — 94799 UNLISTED PULMONARY SVC/PX: CPT

## 2024-05-19 RX ORDER — ACETAMINOPHEN 500 MG
500 TABLET ORAL EVERY 6 HOURS PRN
Status: DISCONTINUED | OUTPATIENT
Start: 2024-05-19 | End: 2024-05-21

## 2024-05-19 RX ORDER — TRAMADOL HYDROCHLORIDE 50 MG/1
TABLET ORAL EVERY 6 HOURS PRN
Qty: 40 TABLET | Refills: 0 | Status: SHIPPED | OUTPATIENT
Start: 2024-05-19

## 2024-05-19 NOTE — PROGRESS NOTES
[facility]  part of [facility]    Progress Note    Ray Bustillo Patient Status:  Outpatient in a Bed    3/31/1945 MRN [id number]   Location [facility] 4W/SW/SE Attending Jason Palomares MD   Hosp Day # 0 PCP Marck Frias MD       Subjective:   Ray Bustillo is a(n) 79 year old male POD# 2 Feels he is making progress. No new complaints. Pain management issues.     Objective:   Vital Signs:  Blood pressure 121/54, pulse 76, temperature 97.1 °F (36.2 °C), temperature source Temporal, resp. rate 18, height 5' 6\" (1.676 m), weight 176 lb (79.8 kg), SpO2 94%.     General: No acute distress. Alert and oriented x 3.  HEENT: Moist mucous membranes. EOM-I. PERRL  Respiratory: Clear to auscultation bilaterally.  No wheezes. No rhonchi.  Cardiovascular: S1, S2.  Regular rate and rhythm.  No murmurs.  Abdomen: Soft, nontender, nondistended.   Neurologic: No focal neurological deficits.  Musculoskeletal: No calf tenderness.   Integument: No lesions. No erythema.  Psychiatric: Appropriate mood and affect.      Results:    Lab Results   Component Value Date    WBC 10.5 2024    HGB 12.7 (L) 2024    HCT 38.2 (L) 2024    .0 2024    CREATSERUM 1.06 2024    BUN 13 2024     2024    K 4.5 2024     2024    CO2 31.0 2024     (H) 2024    CA 9.6 2024    ALB 4.6 2024    ALKPHO 80 2024    BILT 0.7 2024    TP 7.1 2024    AST 25 2024    ALT 23 2024    PTT 27.4 2024    INR 1.06 2024    PSA 0.08 2023    ESRML 16 2022    CRP <0.29 2022         XR LUMBAR SPINE (MIN 2 VIEWS) (CPT=72100)    Result Date: 2024  CONCLUSION:  1. Postoperative changes from a recent L4-S1 laminectomy and L4-L5 posterior instrumented fusion.  No evidence of hardware failure. 2. Grade II anterolisthesis at L4-L5. 3. Moderate spondylotic changes in the lower  lumbar spine.    Dictated by (CST): Gregor Fonseca MD on 5/18/2024 at 10:59 AM     Finalized by (CST): Gregor Fonseca MD on 5/18/2024 at 11:01 AM               Assessment and Plan:       Lumbar spinal stenosis        S/P lumbar fusion  Stable post op care.       Diabetes mellitus, type 2 (HCC)        Essential hypertension        Hyperlipidemia        CAD (coronary artery disease)        JODI (obstructive sleep apnea)                Postoperative Recommendations:  Anticoagulation / DVT prophylaxis: SCDs, early ambulation.  Pain Control: per ortho  GI protection: Protonix  Incentive Spirometry   Telemetry as needed  CPAP/O2 as needed       Pain management and Physical therapy as per Orthopedic service.   Home Health as needed          Get [place] MD  5/19/2024

## 2024-05-19 NOTE — PHYSICAL THERAPY NOTE
PHYSICAL THERAPY TREATMENT NOTE - INPATIENT     Room Number: 408/408-A       Presenting Problem: s/p L4-S1 lami, L4-5 fusion       Problem List  Principal Problem:    Lumbar spinal stenosis  Active Problems:    Diabetes mellitus, type 2 (HCC)    Essential hypertension    Hyperlipidemia    CAD (coronary artery disease)    JODI (obstructive sleep apnea)    S/P lumbar fusion      PHYSICAL THERAPY ASSESSMENT   Patient demonstrates good  progress this session, goals  remain in progress.    Patient continues to function near baseline with bed mobility, transfers, gait, and stair negotiation.  Contributing factors to remaining limitations include decreased functional strength, decreased endurance/aerobic capacity, and pain.  Next session anticipate patient to progress bed mobility, transfers, gait, and stair negotiation.  Physical Therapy will continue to follow patient for duration of hospitalization.    Patient continues to benefit from continued skilled PT services: at discharge to promote functional independence and safety with additional support and return home with home health PT.    PLAN  PT Treatment Plan: Bed mobility;Body mechanics;Endurance;Patient education;Family education;Gait training;Neuromuscular re-educate;Range of motion;Strengthening;Stair training;Transfer training;Balance training  Frequency (Obs): Daily    SUBJECTIVE  \"I didn't feel great yesterday, but I feel better today.\"     OBJECTIVE  Precautions: Spine;Drain(s)    WEIGHT BEARING RESTRICTION                PAIN ASSESSMENT   Ratin  Location: low back  Management Techniques: Activity promotion    BALANCE  Static Sitting: Good  Dynamic Sitting: Fair +  Static Standing: Fair -  Dynamic Standing: Fair -    ACTIVITY TOLERANCE  Pulse: 82  Heart Rate Source: Monitor                    O2 WALK       AM-PAC '6-Clicks' INPATIENT SHORT FORM - BASIC MOBILITY  How much difficulty does the patient currently have...  Patient Difficulty: Turning over in bed  (including adjusting bedclothes, sheets and blankets)?: None   Patient Difficulty: Sitting down on and standing up from a chair with arms (e.g., wheelchair, bedside commode, etc.): A Little   Patient Difficulty: Moving from lying on back to sitting on the side of the bed?: None   How much help from another person does the patient currently need...   Help from Another: Moving to and from a bed to a chair (including a wheelchair)?: A Little   Help from Another: Need to walk in hospital room?: A Little   Help from Another: Climbing 3-5 steps with a railing?: A Little     AM-PAC Score:  Raw Score: 20   Approx Degree of Impairment: 35.83%   Standardized Score (AM-PAC Scale): 47.67   CMS Modifier (G-Code): CJ    FUNCTIONAL ABILITY STATUS  Functional Mobility/Gait Assessment  Gait Assistance: Contact guard assist  Distance (ft): 200 ft  Assistive Device: Rolling walker  Pattern: Shuffle (progressive fatigue/decline in gait pattern)  Sit to Stand: supervision    Additional information: Pt received in bedside chair at start of session, agreeable to participate. Pain 3/10 @ rest, 6-7/10 with activity. Demonstrated functional transfers (from chair and toilet) with supervision and use of RW. Pt ambulated 200 ft in hallway with RW and CGA. Pt initially with steady reciprocal gait pattern but gait pattern declined as walk progressed. Pt noted to have deepening of knee flexion and shuffle pattern. Pt notes worsening pain and general fatigue. Also complained of UE weakness with use of RW. Pt returned to bedside chair at end of session, needs left in reach. Spouse present during 2nd half of session. RN aware of session outcome. Had anticipated to perform stair training but due to weakness and worsening gait pattern, not safe for stair training this session.     The patient's Approx Degree of Impairment: 35.83% has been calculated based on documentation in the [facility] '6 clicks' Inpatient Daily Activity Short Form.  Research supports  that patients with this level of impairment may benefit from home health therapy.    Final disposition will be made by interdisciplinary medical team.    Patient End of Session: Up in chair;Needs met;Call light within reach;RN aware of session/findings;All patient questions and concerns addressed;Family present;Ice applied    CURRENT GOALS   Goals to be met by:   Patient Goal Patient's self-stated goal is:    Goal #1 Patient will perform log roll technique at mod I level with performance of supine<>sit   Goal #1   Current Status  NT (pt up in chair at start) verbally reviewed log roll technique   Goal #2 Patient is able to demonstrate transfers Sit to/from Stand at assistance level: minimum assistance with walker - rolling      Goal #2  Current Status  Goal met.   Goal #3 Patient is able to ambulate >150 feet with assist device: walker - rolling at assistance level: modified independent   Goal #3   Current Status  Progressing: CGA x 200 ft with RW   Goal #4 Patient will negotiate 2 stairs w/ assistive device and supervision   Goal #4   Current Status  NT   Goal #5 Patient to demonstrate independence with home activity/exercise instructions provided to patient in preparation for discharge.   Goal #5   Current Status  Progressing     Gait Training: 15 minutes  Therapeutic Activity: 10 minutes

## 2024-05-19 NOTE — PLAN OF CARE
Patient alert and orientated x4. VSS. SL. Post-op day #2. Dressing in place to lower back- hemovac in place. Monitoring blood glucose levels per order. Tolerating diet. Voiding freely, passing gas. Patient is on room air. CPAP at night. SCDs on for DVT prophylaxis. PRN oxycodone and zanaflex given for Pain. Up with x1 assist and a walker. Encouraged frequent ambulation and use of incentive spirometer. Fall precautions maintained- bed alarm on, bed locked in lowest position, call light and personal belongings within reach, non-skid socks in place to bilateral feet. Frequent rounding by nursing staff. Plan to for home health, needs to work on stairs with PT.              Problem: Patient Centered Care  Goal: Patient preferences are identified and integrated in the patient's plan of care  Description: Interventions:  - What would you like us to know as we care for you?   - Provide timely, complete, and accurate information to patient/family  - Incorporate patient and family knowledge, values, beliefs, and cultural backgrounds into the planning and delivery of care  - Encourage patient/family to participate in care and decision-making at the level they choose  - Honor patient and family perspectives and choices  Outcome: Progressing     Problem: Patient/Family Goals  Goal: Patient/Family Long Term Goal  Description: Patient's Long Term Goal:     Interventions:  - See additional Care Plan goals for specific interventions  Outcome: Progressing  Goal: Patient/Family Short Term Goal  Description: Patient's Short Term Goal:    Interventions:  - See additional Care Plan goals for specific interventions  Outcome: Progressing     Problem: Diabetes/Glucose Control  Goal: Glucose maintained within prescribed range  Description: INTERVENTIONS:  - Monitor Blood Glucose as ordered  - Assess for signs and symptoms of hyperglycemia and hypoglycemia  - Administer ordered medications to maintain glucose within target range  - Assess  barriers to adequate nutritional intake and initiate nutrition consult as needed  - Instruct patient on self management of diabetes  Outcome: Progressing     Problem: PAIN - ADULT  Goal: Verbalizes/displays adequate comfort level or patient's stated pain goal  Description: INTERVENTIONS:  - Encourage pt to monitor pain and request assistance  - Assess pain using appropriate pain scale  - Administer analgesics based on type and severity of pain and evaluate response  - Implement non-pharmacological measures as appropriate and evaluate response  - Consider cultural and social influences on pain and pain management  - Manage/alleviate anxiety  - Utilize distraction and/or relaxation techniques  - Monitor for opioid side effects  - Notify MD/LIP if interventions unsuccessful or patient reports new pain  - Anticipate increased pain with activity and pre-medicate as appropriate  Outcome: Progressing     Problem: RISK FOR INFECTION - ADULT  Goal: Absence of fever/infection during anticipated neutropenic period  Description: INTERVENTIONS  - Monitor WBC  - Administer growth factors as ordered  - Implement neutropenic guidelines  Outcome: Progressing     Problem: SAFETY ADULT - FALL  Goal: Free from fall injury  Description: INTERVENTIONS:  - Assess pt frequently for physical needs  - Identify cognitive and physical deficits and behaviors that affect risk of falls.  - [place] fall precautions as indicated by assessment.  - Educate pt/family on patient safety including physical limitations  - Instruct pt to call for assistance with activity based on assessment  - Modify environment to reduce risk of injury  - Provide assistive devices as appropriate  - Consider OT/PT consult to assist with strengthening/mobility  - Encourage toileting schedule  Outcome: Progressing

## 2024-05-19 NOTE — PLAN OF CARE
POD:2. Patient is A&Ox4. RA. Saline locked. General diet. Remote tele. Cpap @ night. Voiding via urinal. Hemovac to bulb suction. ACHS. PRN Oxycodone for pain management. Incentive spirometer highly encouraged. Elevated temperature overnight 100.8, MD notified, tylenol was ordered for fever reduction. Up by 1 assist with a walker. Call light within reach, frequent rounding. Safety measures in place. Plan for HH when medically clear.       Problem: Patient Centered Care  Goal: Patient preferences are identified and integrated in the patient's plan of care  Description: Interventions:  - What would you like us to know as we care for you?   - Provide timely, complete, and accurate information to patient/family  - Incorporate patient and family knowledge, values, beliefs, and cultural backgrounds into the planning and delivery of care  - Encourage patient/family to participate in care and decision-making at the level they choose  - Honor patient and family perspectives and choices  Outcome: Progressing     Problem: Patient/Family Goals  Goal: Patient/Family Long Term Goal  Description: Patient's Long Term Goal:     Interventions:  -   - See additional Care Plan goals for specific interventions  Outcome: Progressing  Goal: Patient/Family Short Term Goal  Description: Patient's Short Term Goal:     Interventions:   -   - See additional Care Plan goals for specific interventions  Outcome: Progressing     Problem: Diabetes/Glucose Control  Goal: Glucose maintained within prescribed range  Description: INTERVENTIONS:  - Monitor Blood Glucose as ordered  - Assess for signs and symptoms of hyperglycemia and hypoglycemia  - Administer ordered medications to maintain glucose within target range  - Assess barriers to adequate nutritional intake and initiate nutrition consult as needed  - Instruct patient on self management of diabetes  Outcome: Progressing     Problem: PAIN - ADULT  Goal: Verbalizes/displays adequate comfort  level or patient's stated pain goal  Description: INTERVENTIONS:  - Encourage pt to monitor pain and request assistance  - Assess pain using appropriate pain scale  - Administer analgesics based on type and severity of pain and evaluate response  - Implement non-pharmacological measures as appropriate and evaluate response  - Consider cultural and social influences on pain and pain management  - Manage/alleviate anxiety  - Utilize distraction and/or relaxation techniques  - Monitor for opioid side effects  - Notify MD/LIP if interventions unsuccessful or patient reports new pain  - Anticipate increased pain with activity and pre-medicate as appropriate  Outcome: Progressing     Problem: RISK FOR INFECTION - ADULT  Goal: Absence of fever/infection during anticipated neutropenic period  Description: INTERVENTIONS  - Monitor WBC  - Administer growth factors as ordered  - Implement neutropenic guidelines  Outcome: Progressing     Problem: SAFETY ADULT - FALL  Goal: Free from fall injury  Description: INTERVENTIONS:  - Assess pt frequently for physical needs  - Identify cognitive and physical deficits and behaviors that affect risk of falls.  - [place] fall precautions as indicated by assessment.  - Educate pt/family on patient safety including physical limitations  - Instruct pt to call for assistance with activity based on assessment  - Modify environment to reduce risk of injury  - Provide assistive devices as appropriate  - Consider OT/PT consult to assist with strengthening/mobility  - Encourage toileting schedule  Outcome: Progressing     Problem: DISCHARGE PLANNING  Goal: Discharge to home or other facility with appropriate resources  Description: INTERVENTIONS:  - Identify barriers to discharge w/pt and caregiver  - Include patient/family/discharge partner in discharge planning  - Arrange for needed discharge resources and transportation as appropriate  - Identify discharge learning needs (meds, wound care,  etc)  - Arrange for interpreters to assist at discharge as needed  - Consider post-discharge preferences of patient/family/discharge partner  - Complete POLST form as appropriate  - Assess patient's ability to be responsible for managing their own health  - Refer to Case Management Department for coordinating discharge planning if the patient needs post-hospital services based on physician/LIP order or complex needs related to functional status, cognitive ability or social support system  Outcome: Progressing     Problem: GENITOURINARY - ADULT  Goal: Absence of urinary retention  Description: INTERVENTIONS:  - Assess patient’s ability to void and empty bladder  - Monitor intake/output and perform bladder scan as needed  - Follow urinary retention protocol/standard of care  - Consider collaborating with pharmacy to review patient's medication profile  - Implement strategies to promote bladder emptying  Outcome: Progressing     Problem: SKIN/TISSUE INTEGRITY - ADULT  Goal: Skin integrity remains intact  Description: INTERVENTIONS  - Assess and document risk factors for pressure ulcer development  - Assess and document skin integrity  - Monitor for areas of redness and/or skin breakdown  - Initiate interventions, skin care algorithm/standards of care as needed  Outcome: Progressing  Goal: Incision(s), wounds(s) or drain site(s) healing without S/S of infection  Description: INTERVENTIONS:  - Assess and document risk factors for pressure ulcer development  - Assess and document skin integrity  - Assess and document dressing/incision, wound bed, drain sites and surrounding tissue  - Implement wound care per orders  - Initiate isolation precautions as appropriate  - Initiate Pressure Ulcer prevention bundle as indicated  Outcome: Progressing     Problem: MUSCULOSKELETAL - ADULT  Goal: Return mobility to safest level of function  Description: INTERVENTIONS:  - Assess patient stability and activity tolerance for standing,  transferring and ambulating w/ or w/o assistive devices  - Assist with transfers and ambulation using safe patient handling equipment as needed  - Ensure adequate protection for wounds/incisions during mobilization  - Obtain PT/OT consults as needed  - Advance activity as appropriate  - Communicate ordered activity level and limitations with patient/family  Outcome: Progressing  Goal: Maintain proper alignment of affected body part  Description: INTERVENTIONS:  - Support and protect limb and body alignment per provider's orders  - Instruct and reinforce with patient and family use of appropriate assistive device and precautions (e.g. spinal or hip dislocation precautions)  Outcome: Progressing     Problem: Impaired Functional Mobility  Goal: Achieve highest/safest level of mobility/gait  Description: Interventions:  - Assess patient's functional ability and stability  - Promote increasing activity/tolerance for mobility and gait  - Educate and engage patient/family in tolerated activity level and precaution  Outcome: Progressing     Problem: Impaired Activities of Daily Living  Goal: Achieve highest/safest level of independence in self care  Description: Interventions:  - Assess ability and encourage patient to participate in ADLs to maximize function  - Promote sitting position while performing ADLs such as feeding, grooming, and bathing  - Educate and encourage patient/family in tolerated functional activity level and precautions during self-care    Outcome: Progressing

## 2024-05-20 LAB
GLUCOSE BLDC GLUCOMTR-MCNC: 149 MG/DL (ref 70–99)
GLUCOSE BLDC GLUCOMTR-MCNC: 157 MG/DL (ref 70–99)
GLUCOSE BLDC GLUCOMTR-MCNC: 191 MG/DL (ref 70–99)
GLUCOSE BLDC GLUCOMTR-MCNC: 222 MG/DL (ref 70–99)

## 2024-05-20 PROCEDURE — 97530 THERAPEUTIC ACTIVITIES: CPT

## 2024-05-20 PROCEDURE — 97116 GAIT TRAINING THERAPY: CPT

## 2024-05-20 PROCEDURE — 94799 UNLISTED PULMONARY SVC/PX: CPT

## 2024-05-20 PROCEDURE — 82962 GLUCOSE BLOOD TEST: CPT

## 2024-05-20 RX ORDER — TAMSULOSIN HYDROCHLORIDE 0.4 MG/1
0.4 CAPSULE ORAL NIGHTLY
Status: DISCONTINUED | OUTPATIENT
Start: 2024-05-20 | End: 2024-05-21

## 2024-05-20 NOTE — DISCHARGE INSTRUCTIONS
Sometimes managing your health at home requires assistance.  The [facility] team has recognized your preference to use [facility] (previously known as [employer]), Phone: [phone] or Fax: [phone]. A representative from the home health agency will contact you or your family to schedule your first visit.         Discharge Instructions Dr. Jason Palomares  POSTERIOR LUMBAR INTERBODY FUSION (TLIF),  ANTERIOR LUMBAR INTERBODY FUSION (ALIF),  EXTREME LATERAL INTERBODY FUSION (XLIF)  POSTERIOLATERAL FUSION (PLF)    Wound Care  Do not change or remove your initial postoperative dressing for the first 2 days after surgery   unless instructed to do so by Dr. Palomares's staff or if it is saturated by drainage. If removed,   please reapply a clean dry dressing over the incision. You may have thin adhesive paper strips   on your incision after surgery--please do NOT remove them. They will be removed at your 2-  week postoperative appointment. You may remove your dressing and shower on the third day   after surgery. Let warm soapy water run over the incisional area. Do not scrub the area.   Gently pat the incisional area completely dry. You may reapply a clean dry dressing over the   incision to prevent irritation from clothing, but this is not required after post op day 3. You are   not allowed to soak your incision in a bathtub, hot tub, or swimming pool until the incision is   completely healed and Dr. Palomares or his staff permit these activities. Do not apply antibiotic   gels, ointments (Neosporin or bacitracin), lotions, peroxide or iodine solutions on or around the   Incision.  You may have some swelling and/or clear yellow drainage around your incisional site. This is   normal and may take several weeks to go away. Please leave any superficial scabs alone and let   them fall off on their own.    Immediate Follow Up  If you notice incisional redness or increased swelling,  continuous clear drainage or change in   color to the drainage, malodor, significant leg swelling, increased pain and/or a fever greater   than 101.5, you should call our office. If it is after business hours you should present to the   closest emergency room. If you have shortness of breath, chest pain, significant stomach pain   and bloating without a bowel movement, complete loss of bowel or bladder function please   contact our office and present to your local emergency room as soon as possible.    Post-Operative Instructions - Lumbar  After surgery you may experience pain in or around the region of the incision. Some buttock   and leg pain as well as tingling or numbness may also be present. Initially it may be of greater   intensity than before surgery but will usually subside over time as the healing process occurs.   This discomfort is caused from surgical retraction of tissue as well as inflammation and swelling   of previously compressed nerves.  Early activity after surgery is extremely important to help prevent the complications such as   pneumonia and blood clots. Activity also promotes recovery, relieves muscle stiffness, allows   for development of a well-organized scar, and improves your outcome. Your recovery is an   essential part of the surgical process so please follow the guidelines below to return to your   desired activities as safely as possible.     Week 1  ? Try to get at least 8 hours of sleep each night. A disrupted sleep pattern is common   after discharge from the hospital and will return to normal over time.  ? Avoid bending and twisting at the waist. No bending more than what is required to get   dressed. No twisting more than required to toilet (wipe yourself).  ? No sitting for more than 1 hour at a time. Walk and/or change position before returning   to a sitting position.   ? You may not drive, but you may be driven.  ? Begin a daily walking program with 1-2 blocks initially;  schedule a daily time and   increase distance daily. Ideally, we would like you to be up and walking 15   minutes/hour.   ? Eat a balanced high-fiber diet and drink plenty of water.  ? Take medications as prescribed, using narcotics and muscle relaxants only as needed.  ? Do not restart any NSAIDs such as Advil, Motrin, Aleve, ibuprofen, naproxen, diclofenac,   meloxicam or celecoxib for at least 3 months after your fusion surgery.   ? Take stool softeners to help with bowel movements.    Week 2  ? Resume normal rising and retiring schedule but continue to rest throughout the day as   needed.  ? You may not drive, but you may be driven.  ? No lifting of anything weighing more than 10 to 15 pounds.  ? Continue scheduled walking, increasing distance and frequency as tolerated.  ? May resume sexual relations when comfortable between 2 to 4 weeks after surgery.  Please be advised the patient must be in the top position until 6 weeks after surgery.   ? Begin weaning from narcotic pain medications if you have not already.  ? Follow-up in the office as scheduled for further instructions.     Disability  The usual period of recovery for Lumbar Spinal Fusion surgery is 8 to 12 weeks and complete   healing may take from 6 to 9 months. Some patients may return to work sooner than others   depending on their job, response to surgery, and ability to perform other lighter tasks in the   workplace. Physician approval is required prior to returning to work.    Post-Operative Pain Medication Policy  It is Dr. Palomares's aim to make you as comfortable as possible after surgery. We realize pain   management is not perfect, and that you will have some discomfort after your operation.   There are several factors that limit our ability to completely eliminate pain after surgery. This   first is that pain medications have side effects. Please be advised that high doses or continued   use of narcotic medications may put you at risk for  serious health issues including but not   limited to respiratory depression (decreased ability to breathe normally), hypotension (low   blood pressure), nausea and constipation, generalized itching, urinary retention (inability to   urinate) and abdominal distention.   Dr. Palomares will prescribe pain medication for 2-3 weeks after surgery and may refer you out to a   pain management specialist for any narcotic medication requested after this period of time.    Preventing Opioid Addiction  [facility] is committed to protecting our patients from Opioid addiction.   We only prescribe opioids when necessary. Whenever possible, we use less-addictive pain   medication and alternative pain management options. Both high-dosage opioids and lowdosage opioids taken over long periods of time can increase the risk of addiction. We attempt to limit our prescriptions of opioids after surgery as much as possible, which may include lower   dosages of opioid medications or fewer pills. If you have additional questions about Opioids   and their dependency, please contact our clinic.    Contact Information  Please contact Dr. Jelani Rapp’s , at [phone] with any   questions or concerns pertaining to scheduling or other administrative issues.  Please contact Dr. Jelani Andrews PA-C’s physician assistant, at [phone] with   any medical questions or concerns.     AFTER HOURS EMERGENCY CONTACT: Please dial [phone] and press “0”  for the  to connect you to the orthopaedic fellow or resident on call if you have any   urgent questions or concerns after regular business hours. If you do not hear back from the oncall physician in a timely matter and your urgent matter turns emergent, you should present to   your local emergency room. Please follow-up with Janice Toussaint PA-C the following business   day with an update if you do have to contact the on-call physician or  present to your local   emergency room after surgery.     Future Dental Appointments  (3 Months following surgery): Prior to going to the dentist, please notify Dr. Palomares and let your   Dentist know that you had a spinal fusion surgery. Have the treating physician prescribe   antibiotics prior to the procedure. The appropriate medications and guidelines are as follows:  ? Amoxicillin 2 grams 1 hour prior to procedure. If allergic to Penicillin, Clindamycin 600   mg, 1 hour prior to procedure. No second doses are required following the dental   procedure

## 2024-05-20 NOTE — CM/SW NOTE
Anticipated therapy need: Home with Home Healthcare. HH referrals sent in [facility]. [facility] ([facility] is able to accept and is preferred agency for Dr Palomares's patients, agency reserved in AIDIN.    Plan: Home w/spouse with [facility] pending medical clearance.    OKSANA Ramos    [phone]

## 2024-05-20 NOTE — OPERATIVE REPORT
PATIENT  Ray Bustillo      DATE OF SURGERY  5/17/24     SURGEON   Jason Palomares MD     ASSISTANT  NOREEN Andrews     PREOPERATIVE DIAGNOSIS   Severe spinal stenosis L4-S1  Degenerative spondylolistehsis  L4-5     POSTOPERATIVE DIAGNOSIS   Same     PROCEDURES   1. L4-S1 laminectomy under direct visualization.   2. Posterior spinal fusion with instrumentation L4-5  3. Use of fluoroscopy.   4. Use of intra-operative microscope.     DESCRIPTION OF PROCEDURE   Bilateral thecal sac decompression was performed. Exiting nerve roots were identified.      ANESTHESIA   General endotracheal     COMPLICATIONS   None.      BLOOD LOSS   Minimal.      INDICATIONS   The patient is a 79 year-old female with neurogenic claudication and   bilateral lumbar radiculopathy. They were diagnosed with imaging studies   confirming a severe central and lateral recess stenosis. The patient failed   nonoperative intervention and they elected to proceed with surgical   Intervention. She understood the risks and benefits, including risks of failure to improve, neurologic deterioration, infection, durotomy, continued low back pain, and progression of her spondylolisthesis were explained to the patient at length. The patient also understood the risks of anesthesia which include possible stroke, MI, death.         TECHNIQUE   The patient was brought to the operating room. General   anesthesia with endotracheal intubation was performed. The patient was positioned prone on the Johnny spinal frame. Care was taken to ensure bony prominences   were well padded. The lower back was prepped and draped in the   Usual sterile fashion. A surgical timeout was performed according to the WHO standards identifying the appropriate patient, surgical site, and surgical procedure.   Under fluoroscopy, the operative levels were identified. A midline incision was made after local anesthetic was injected into the tissue. Bovie cautery was used to subperiosteally  dissect the midline muscle from the midline elements out to the medial portion of the facets and over the transverse processes and L4-5 and L5-S1. A fluoroscopy image was taken to confirm the correct levels.   Using a kallie, the lamina and medial facets bilaterally were removed down to the ligamentum leaving the pars intact at L4-5, and L5-S1. The inferior articular process was removed bilaterally for posterior osteotomy for alignment and decompression. The ligamentum was removed piecemeal and the dura was exposed. The remaining lamina was removed.   An angled hockey stick elevator was tracked underneath the thecal sac proximally and distally and ensured adequate decompression with no remaining fragments compressing the  nerve root or the thecal sac. At this point, we were satisfied with the extent of the decompression, the neural elements were seen to be free.      We then turned out attention to the screw placement. The start points were localized to the L4 and L5 pars and transverse processes intersection. The pedicles were cannulated and a screw was placed. EMG tested within normal limits.      The wound was thoroughly irrigated and hemostasis was ensured.   Sterile solution was infiltrated.   Meticulous hemostasis was achieved.   Fascia was approximated with 0 Vicryl suture. Subcutaneous tissue   and skin was approximated with suture. Sterile dressing applied.   The patient returned to recovery in stable condition.      I was present for and performed the entire procedure.

## 2024-05-20 NOTE — PHYSICAL THERAPY NOTE
PHYSICAL THERAPY TREATMENT NOTE - INPATIENT     Room Number: 408/408-A       Presenting Problem: s/p L4-S1 lami, L4-5 fusion       Problem List  Principal Problem:    Lumbar spinal stenosis  Active Problems:    Diabetes mellitus, type 2 (HCC)    Essential hypertension    Hyperlipidemia    CAD (coronary artery disease)    JODI (obstructive sleep apnea)    S/P lumbar fusion      PHYSICAL THERAPY ASSESSMENT   Patient demonstrates good  progress this session, goals  remain in progress.    Patient continues to function below baseline with bed mobility, transfers, and gait.  Contributing factors to remaining limitations include decreased functional strength, decreased endurance/aerobic capacity, and pain.  Next session anticipate patient to progress bed mobility, transfers, and gait.  Physical Therapy will continue to follow patient for duration of hospitalization.    Patient continues to benefit from continued skilled PT services: at discharge to promote functional independence in home.  Anticipate patient will return home with home health PT.    PLAN  PT Treatment Plan: Bed mobility;Endurance;Gait training  Frequency (Obs): Daily    SUBJECTIVE  Pt reports being ready for PT RX    OBJECTIVE  Precautions: Spine;Drain(s)    WEIGHT BEARING RESTRICTION                PAIN ASSESSMENT   Ratin  Location: low back  Management Techniques: Activity promotion    BALANCE  Static Sitting: Good  Dynamic Sitting: Fair +  Static Standing: Fair -  Dynamic Standing: Fair -    ACTIVITY TOLERANCE                          O2 WALK       AM-PAC '6-Clicks' INPATIENT SHORT FORM - BASIC MOBILITY  How much difficulty does the patient currently have...  Patient Difficulty: Turning over in bed (including adjusting bedclothes, sheets and blankets)?: None   Patient Difficulty: Sitting down on and standing up from a chair with arms (e.g., wheelchair, bedside commode, etc.): A Little   Patient Difficulty: Moving from lying on back to sitting on the  side of the bed?: None   How much help from another person does the patient currently need...   Help from Another: Moving to and from a bed to a chair (including a wheelchair)?: A Little   Help from Another: Need to walk in hospital room?: A Little   Help from Another: Climbing 3-5 steps with a railing?: A Little     AM-PAC Score:  Raw Score: 20   Approx Degree of Impairment: 35.83%   Standardized Score (AM-PAC Scale): 47.67   CMS Modifier (G-Code): CJ    FUNCTIONAL ABILITY STATUS  Functional Mobility/Gait Assessment  Gait Assistance: Contact guard assist  Distance (ft): 2 x 50  Assistive Device: Rolling walker  Pattern: Shuffle  Stairs: Stairs  How Many Stairs: 4  Device: 2 Rails  Assist: Minimal assist  Pattern: Ascend and Descend  Rolling: minimal assist  Supine to Sit: minimal assist  Sit to Supine: minimal assist  Sit to Stand: minimal assist    Additional information: Pt seen daily.Min a for bed  mobility and transfer;extra time provided to complete task.EOB sitting balance activity with emphasis on core stabilization.Spinal precautions reviewed;education.Pt amb 2 x 50 ft with RW and CGA.Navigated 4 stairs with Min a.There ex.    The patient's Approx Degree of Impairment: 35.83% has been calculated based on documentation in the [facility] '6 clicks' Inpatient Daily Activity Short Form.  Research supports that patients with this level of impairment may benefit from Home with Home Health PT.  Final disposition will be made by interdisciplinary medical team.    THERAPEUTIC EXERCISES  Lower Extremity Ankle pumps  Glut sets  Hip AB/AD  Quad sets     Position Supine       Patient End of Session: Up in chair    CURRENT GOALS     Gait Train  Patient Goal Patient's self-stated goal is:    Goal #1 Patient will perform log roll technique at mod I level with performance of supine<>sit   Goal #1   Current Status  Min a   Goal #2 Patient is able to demonstrate transfers Sit to/from Stand at assistance level: minimum assistance  with walker - rolling      Goal #2  Current Status  Min a.   Goal #3 Patient is able to ambulate >150 feet with assist device: walker - rolling at assistance level: modified independent   Goal #3   Current Status  Pt amb 2 x 50 ft with RW and CGA   Goal #4 Patient will negotiate 2 stairs w/ assistive device and supervision   Goal #4   Current Status  Navigated 4  stairs with Min a   Goal #5 Patient to demonstrate independence with home activity/exercise instructions provided to patient in preparation for discharge.   Goal #5   Current Status  Progressing   ing: 15 minutes  Therapeutic Activity: 15 minutes  Neuromuscular Re-education:  minutes  Therapeutic Exercise:  minutes  Canalith Repositioning:  minutes  Manual Therapy:  minutes  Can add/delete any of these

## 2024-05-20 NOTE — PLAN OF CARE
Pt alert and oriented x4. On room air. VSS.  POD 3 of Laminectomy/fusion. Hemovac in place, documented output. Pt did not do well with stairs today, will work with PT again tomorrow. Voiding freely, ambulating with walker x1 assist. Scd and teds for dvt prophylaxis.  Plan, pain management needs to pass stairs and Hemovac removal once output is decreased     Problem: Patient Centered Care  Goal: Patient preferences are identified and integrated in the patient's plan of care  Description: Interventions:  - Provide timely, complete, and accurate information to patient/family  - Incorporate patient and family knowledge, values, beliefs, and cultural backgrounds into the planning and delivery of care  - Encourage patient/family to participate in care and decision-making at the level they choose  - Honor patient and family perspectives and choices  Outcome: Progressing     Problem: Diabetes/Glucose Control  Goal: Glucose maintained within prescribed range  Description: INTERVENTIONS:  - Monitor Blood Glucose as ordered  - Assess for signs and symptoms of hyperglycemia and hypoglycemia  - Administer ordered medications to maintain glucose within target range  - Assess barriers to adequate nutritional intake and initiate nutrition consult as needed  - Instruct patient on self management of diabetes  Outcome: Progressing     Problem: PAIN - ADULT  Goal: Verbalizes/displays adequate comfort level or patient's stated pain goal  Description: INTERVENTIONS:  - Encourage pt to monitor pain and request assistance  - Assess pain using appropriate pain scale  - Administer analgesics based on type and severity of pain and evaluate response  - Implement non-pharmacological measures as appropriate and evaluate response  - Consider cultural and social influences on pain and pain management  - Manage/alleviate anxiety  - Utilize distraction and/or relaxation techniques  - Monitor for opioid side effects  - Notify MD/LIP if interventions  unsuccessful or patient reports new pain  - Anticipate increased pain with activity and pre-medicate as appropriate  Outcome: Progressing     Problem: RISK FOR INFECTION - ADULT  Goal: Absence of fever/infection during anticipated neutropenic period  Description: INTERVENTIONS  - Monitor WBC  - Administer growth factors as ordered  - Implement neutropenic guidelines  Outcome: Progressing     Problem: SAFETY ADULT - FALL  Goal: Free from fall injury  Description: INTERVENTIONS:  - Assess pt frequently for physical needs  - Identify cognitive and physical deficits and behaviors that affect risk of falls.  - [place] fall precautions as indicated by assessment.  - Educate pt/family on patient safety including physical limitations  - Instruct pt to call for assistance with activity based on assessment  - Modify environment to reduce risk of injury  - Provide assistive devices as appropriate  - Consider OT/PT consult to assist with strengthening/mobility  - Encourage toileting schedule  Outcome: Progressing     Problem: DISCHARGE PLANNING  Goal: Discharge to home or other facility with appropriate resources  Description: INTERVENTIONS:  - Identify barriers to discharge w/pt and caregiver  - Include patient/family/discharge partner in discharge planning  - Arrange for needed discharge resources and transportation as appropriate  - Identify discharge learning needs (meds, wound care, etc)  - Arrange for interpreters to assist at discharge as needed  - Consider post-discharge preferences of patient/family/discharge partner  - Complete POLST form as appropriate  - Assess patient's ability to be responsible for managing their own health  - Refer to Case Management Department for coordinating discharge planning if the patient needs post-hospital services based on physician/LIP order or complex needs related to functional status, cognitive ability or social support system  Outcome: Progressing     Problem: GENITOURINARY -  ADULT  Goal: Absence of urinary retention  Description: INTERVENTIONS:  - Assess patient’s ability to void and empty bladder  - Monitor intake/output and perform bladder scan as needed  - Follow urinary retention protocol/standard of care  - Consider collaborating with pharmacy to review patient's medication profile  - Implement strategies to promote bladder emptying  Outcome: Progressing     Problem: SKIN/TISSUE INTEGRITY - ADULT  Goal: Skin integrity remains intact  Description: INTERVENTIONS  - Assess and document risk factors for pressure ulcer development  - Assess and document skin integrity  - Monitor for areas of redness and/or skin breakdown  - Initiate interventions, skin care algorithm/standards of care as needed  Outcome: Progressing  Goal: Incision(s), wounds(s) or drain site(s) healing without S/S of infection  Description: INTERVENTIONS:  - Assess and document risk factors for pressure ulcer development  - Assess and document skin integrity  - Assess and document dressing/incision, wound bed, drain sites and surrounding tissue  - Implement wound care per orders  - Initiate isolation precautions as appropriate  - Initiate Pressure Ulcer prevention bundle as indicated  Outcome: Progressing     Problem: MUSCULOSKELETAL - ADULT  Goal: Return mobility to safest level of function  Description: INTERVENTIONS:  - Assess patient stability and activity tolerance for standing, transferring and ambulating w/ or w/o assistive devices  - Assist with transfers and ambulation using safe patient handling equipment as needed  - Ensure adequate protection for wounds/incisions during mobilization  - Obtain PT/OT consults as needed  - Advance activity as appropriate  - Communicate ordered activity level and limitations with patient/family  Outcome: Progressing  Goal: Maintain proper alignment of affected body part  Description: INTERVENTIONS:  - Support and protect limb and body alignment per provider's orders  - Instruct  and reinforce with patient and family use of appropriate assistive device and precautions (e.g. spinal or hip dislocation precautions)  Outcome: Progressing     Problem: Impaired Functional Mobility  Goal: Achieve highest/safest level of mobility/gait  Description: Interventions:  - Assess patient's functional ability and stability  - Promote increasing activity/tolerance for mobility and gait  - Educate and engage patient/family in tolerated activity level and precautions  Outcome: Progressing     Problem: Impaired Activities of Daily Living  Goal: Achieve highest/safest level of independence in self care  Description: Interventions:  - Assess ability and encourage patient to participate in ADLs to maximize function  - Promote sitting position while performing ADLs such as feeding, grooming, and bathing  - Educate and encourage patient/family in tolerated functional activity level and precautions during self-care  Outcome: Progressing

## 2024-05-20 NOTE — PLAN OF CARE
POD:3. Patient is A&Ox4. RA. Saline locked. General diet. Remote tele. Cpap @ night. Voiding via urinal. Hemovac to bulb suction. ACHS. PRN Oxycodone for pain management. Incentive spirometer highly encouraged.Up by 1 assist with a walker. Call light within reach, frequent rounding. Safety measures in place. Plan for HH when medically clear.          Problem: Patient Centered Care  Goal: Patient preferences are identified and integrated in the patient's plan of care  Description: Interventions:  - What would you like us to know as we care for you?   - Provide timely, complete, and accurate information to patient/family  - Incorporate patient and family knowledge, values, beliefs, and cultural backgrounds into the planning and delivery of care  - Encourage patient/family to participate in care and decision-making at the level they choose  - Honor patient and family perspectives and choices  Outcome: Progressing     Problem: Patient/Family Goals  Goal: Patient/Family Long Term Goal  Description: Patient's Long Term Goal:     Interventions:  -   - See additional Care Plan goals for specific interventions  Outcome: Progressing  Goal: Patient/Family Short Term Goal  Description: Patient's Short Term Goal:     Interventions:   -   - See additional Care Plan goals for specific interventions  Outcome: Progressing     Problem: Diabetes/Glucose Control  Goal: Glucose maintained within prescribed range  Description: INTERVENTIONS:  - Monitor Blood Glucose as ordered  - Assess for signs and symptoms of hyperglycemia and hypoglycemia  - Administer ordered medications to maintain glucose within target range  - Assess barriers to adequate nutritional intake and initiate nutrition consult as needed  - Instruct patient on self management of diabetes  Outcome: Progressing     Problem: PAIN - ADULT  Goal: Verbalizes/displays adequate comfort level or patient's stated pain goal  Description: INTERVENTIONS:  - Encourage pt to monitor  pain and request assistance  - Assess pain using appropriate pain scale  - Administer analgesics based on type and severity of pain and evaluate response  - Implement non-pharmacological measures as appropriate and evaluate response  - Consider cultural and social influences on pain and pain management  - Manage/alleviate anxiety  - Utilize distraction and/or relaxation techniques  - Monitor for opioid side effects  - Notify MD/LIP if interventions unsuccessful or patient reports new pain  - Anticipate increased pain with activity and pre-medicate as appropriate  Outcome: Progressing     Problem: RISK FOR INFECTION - ADULT  Goal: Absence of fever/infection during anticipated neutropenic period  Description: INTERVENTIONS  - Monitor WBC  - Administer growth factors as ordered  - Implement neutropenic guidelines  Outcome: Progressing     Problem: SAFETY ADULT - FALL  Goal: Free from fall injury  Description: INTERVENTIONS:  - Assess pt frequently for physical needs  - Identify cognitive and physical deficits and behaviors that affect risk of falls.  - [place] fall precautions as indicated by assessment.  - Educate pt/family on patient safety including physical limitations  - Instruct pt to call for assistance with activity based on assessment  - Modify environment to reduce risk of injury  - Provide assistive devices as appropriate  - Consider OT/PT consult to assist with strengthening/mobility  - Encourage toileting schedule  Outcome: Progressing     Problem: DISCHARGE PLANNING  Goal: Discharge to home or other facility with appropriate resources  Description: INTERVENTIONS:  - Identify barriers to discharge w/pt and caregiver  - Include patient/family/discharge partner in discharge planning  - Arrange for needed discharge resources and transportation as appropriate  - Identify discharge learning needs (meds, wound care, etc)  - Arrange for interpreters to assist at discharge as needed  - Consider post-discharge  preferences of patient/family/discharge partner  - Complete POLST form as appropriate  - Assess patient's ability to be responsible for managing their own health  - Refer to Case Management Department for coordinating discharge planning if the patient needs post-hospital services based on physician/LIP order or complex needs related to functional status, cognitive ability or social support system  Outcome: Progressing     Problem: GENITOURINARY - ADULT  Goal: Absence of urinary retention  Description: INTERVENTIONS:  - Assess patient’s ability to void and empty bladder  - Monitor intake/output and perform bladder scan as needed  - Follow urinary retention protocol/standard of care  - Consider collaborating with pharmacy to review patient's medication profile  - Implement strategies to promote bladder emptying  Outcome: Progressing     Problem: SKIN/TISSUE INTEGRITY - ADULT  Goal: Skin integrity remains intact  Description: INTERVENTIONS  - Assess and document risk factors for pressure ulcer development  - Assess and document skin integrity  - Monitor for areas of redness and/or skin breakdown  - Initiate interventions, skin care algorithm/standards of care as needed  Outcome: Progressing  Goal: Incision(s), wounds(s) or drain site(s) healing without S/S of infection  Description: INTERVENTIONS:  - Assess and document risk factors for pressure ulcer development  - Assess and document skin integrity  - Assess and document dressing/incision, wound bed, drain sites and surrounding tissue  - Implement wound care per orders  - Initiate isolation precautions as appropriate  - Initiate Pressure Ulcer prevention bundle as indicated  Outcome: Progressing     Problem: MUSCULOSKELETAL - ADULT  Goal: Return mobility to safest level of function  Description: INTERVENTIONS:  - Assess patient stability and activity tolerance for standing, transferring and ambulating w/ or w/o assistive devices  - Assist with transfers and  ambulation using safe patient handling equipment as needed  - Ensure adequate protection for wounds/incisions during mobilization  - Obtain PT/OT consults as needed  - Advance activity as appropriate  - Communicate ordered activity level and limitations with patient/family  Outcome: Progressing  Goal: Maintain proper alignment of affected body part  Description: INTERVENTIONS:  - Support and protect limb and body alignment per provider's orders  - Instruct and reinforce with patient and family use of appropriate assistive device and precautions (e.g. spinal or hip dislocation precautions)  Outcome: Progressing     Problem: Impaired Functional Mobility  Goal: Achieve highest/safest level of mobility/gait  Description: Interventions:  - Assess patient's functional ability and stability  - Promote increasing activity/tolerance for mobility and gait  - Educate and engage patient/family in tolerated activity level and precautions    Outcome: Progressing     Problem: Impaired Activities of Daily Living  Goal: Achieve highest/safest level of independence in self care  Description: Interventions:  - Assess ability and encourage patient to participate in ADLs to maximize function  - Promote sitting position while performing ADLs such as feeding, grooming, and bathing  - Educate and encourage patient/family in tolerated functional activity level and precautions during self-care    Outcome: Progressing

## 2024-05-20 NOTE — PROGRESS NOTES
[facility]  part of [facility]    Progress Note    Ray Bustillo Patient Status:  Outpatient in a Bed    3/31/1945 MRN [id number]   Location [facility] 4W/SW/SE Attending Jason Palomares MD   Hosp Day # 0 PCP Marck Frias MD     SUBJECTIVE:  Interval History: The patient is doing well overall.  The post operative pain is well managed with the current medications.  He reports some continued moderate pain to the anterior thighs but feels the preop posterolateral and back pain have improved.  He denies any fever, chills, gross bowel/bladder incontinence or saddle anesthesia. They also deny calf pain, cramping, chest pain, difficulty breathing or shortness of breath. They are getting up for transfers and walking without difficulty. His hemovac output to suction was 70 mL overnight over 8 hours.     Post-Op Day: 3    OBJECTIVE:  Blood pressure 159/61, pulse 70, temperature 99.7 °F (37.6 °C), temperature source Oral, resp. rate 18, height 5' 6\" (1.676 m), weight 176 lb (79.8 kg), SpO2 100%.     Ortho Spine Exam:  Incisional dressing is clean, dry and intact.  Adjacent skin is without erythema or edema.  Motor exam is 4/5 left tib ant/EHL otherwise 5/5 bilateral lower extremity.  2+ dorsalis pedis and posterior tibialis pulses.  Sensation is intact distally. Calves are soft and non-tender to palpation.      ASSESSMENT/PLAN:    S/P L4-S1 laminectomy with L4-5 PSF    1) Continue current pain management protocol  2) Plan to discharge home pending clearance from the medicine/hospitalist team and PT/OT and hemovac output, will recheck drain at 2pm.  3) Post operative medications were sent to patient's pharmacy outside of [facility]   4) Plan to have patient remove their dressing after drain is removed  5) Follow up in clinic in 2 to 3 weeks, appointment should already be scheduled, please have patient call [phone] to confirm or change date/location.   6) All questions answered by the  bedside today     Janice Toussaint PA-C  5/20/2024  7:23 AM

## 2024-05-21 VITALS
WEIGHT: 176 LBS | RESPIRATION RATE: 16 BRPM | SYSTOLIC BLOOD PRESSURE: 118 MMHG | TEMPERATURE: 98 F | HEART RATE: 73 BPM | BODY MASS INDEX: 28.28 KG/M2 | HEIGHT: 66 IN | DIASTOLIC BLOOD PRESSURE: 51 MMHG | OXYGEN SATURATION: 91 %

## 2024-05-21 LAB — GLUCOSE BLDC GLUCOMTR-MCNC: 243 MG/DL (ref 70–99)

## 2024-05-21 PROCEDURE — 97530 THERAPEUTIC ACTIVITIES: CPT

## 2024-05-21 PROCEDURE — 82962 GLUCOSE BLOOD TEST: CPT

## 2024-05-21 PROCEDURE — 94799 UNLISTED PULMONARY SVC/PX: CPT

## 2024-05-21 PROCEDURE — 97116 GAIT TRAINING THERAPY: CPT

## 2024-05-21 PROCEDURE — 97110 THERAPEUTIC EXERCISES: CPT

## 2024-05-21 NOTE — PLAN OF CARE
Pt alert and oriented x4 on room air. VSS. Hemovac removed overnight. Patients pain under control with prn oxycodone. Ambulating x1 with walker. Voiding freely. Pt cleared for discharge. Instructions provided at bedside. Escorted to private vehicle via wheelchair. Pt will be seeing [facility].     Problem: Patient Centered Care  Goal: Patient preferences are identified and integrated in the patient's plan of care  Description: Interventions:  - Provide timely, complete, and accurate information to patient/family  - Incorporate patient and family knowledge, values, beliefs, and cultural backgrounds into the planning and delivery of care  - Encourage patient/family to participate in care and decision-making at the level they choose  - Honor patient and family perspectives and choices  5/21/2024 1035 by Gisela Huff, RN  Outcome: Completed     Problem: Diabetes/Glucose Control  Goal: Glucose maintained within prescribed range  Description: INTERVENTIONS:  - Monitor Blood Glucose as ordered  - Assess for signs and symptoms of hyperglycemia and hypoglycemia  - Administer ordered medications to maintain glucose within target range  - Assess barriers to adequate nutritional intake and initiate nutrition consult as needed  - Instruct patient on self management of diabetes  5/21/2024 1035 by Gisela Huff, RN  Outcome: Completed     Problem: PAIN - ADULT  Goal: Verbalizes/displays adequate comfort level or patient's stated pain goal  Description: INTERVENTIONS:  - Encourage pt to monitor pain and request assistance  - Assess pain using appropriate pain scale  - Administer analgesics based on type and severity of pain and evaluate response  - Implement non-pharmacological measures as appropriate and evaluate response  - Consider cultural and social influences on pain and pain management  - Manage/alleviate anxiety  - Utilize distraction and/or relaxation techniques  - Monitor for opioid side effects  - Notify  MD/LIP if interventions unsuccessful or patient reports new pain  - Anticipate increased pain with activity and pre-medicate as appropriate  5/21/2024 1035 by Gisela Huff, RN  Outcome: Completed     Problem: RISK FOR INFECTION - ADULT  Goal: Absence of fever/infection during anticipated neutropenic period  Description: INTERVENTIONS  - Monitor WBC  - Administer growth factors as ordered  - Implement neutropenic guidelines  5/21/2024 1035 by Gisela Huff, RN  Outcome: Completed     Problem: SAFETY ADULT - FALL  Goal: Free from fall injury  Description: INTERVENTIONS:  - Assess pt frequently for physical needs  - Identify cognitive and physical deficits and behaviors that affect risk of falls.  - [place] fall precautions as indicated by assessment.  - Educate pt/family on patient safety including physical limitations  - Instruct pt to call for assistance with activity based on assessment  - Modify environment to reduce risk of injury  - Provide assistive devices as appropriate  - Consider OT/PT consult to assist with strengthening/mobility  - Encourage toileting schedule  5/21/2024 1035 by Gisela Huff, RN  Outcome: Completed     Problem: DISCHARGE PLANNING  Goal: Discharge to home or other facility with appropriate resources  Description: INTERVENTIONS:  - Identify barriers to discharge w/pt and caregiver  - Include patient/family/discharge partner in discharge planning  - Arrange for needed discharge resources and transportation as appropriate  - Identify discharge learning needs (meds, wound care, etc)  - Arrange for interpreters to assist at discharge as needed  - Consider post-discharge preferences of patient/family/discharge partner  - Complete POLST form as appropriate  - Assess patient's ability to be responsible for managing their own health  - Refer to Case Management Department for coordinating discharge planning if the patient needs post-hospital services based on physician/LIP order  or complex needs related to functional status, cognitive ability or social support system  5/21/2024 1035 by Gisela Huff RN  Outcome: Completed     Problem: GENITOURINARY - ADULT  Goal: Absence of urinary retention  Description: INTERVENTIONS:  - Assess patient’s ability to void and empty bladder  - Monitor intake/output and perform bladder scan as needed  - Follow urinary retention protocol/standard of care  - Consider collaborating with pharmacy to review patient's medication profile  - Implement strategies to promote bladder emptying  5/21/2024 1035 by Gisela Huff RN  Outcome: Completed     Problem: SKIN/TISSUE INTEGRITY - ADULT  Goal: Skin integrity remains intact  Description: INTERVENTIONS  - Assess and document risk factors for pressure ulcer development  - Assess and document skin integrity  - Monitor for areas of redness and/or skin breakdown  - Initiate interventions, skin care algorithm/standards of care as needed  5/21/2024 1035 by Gisela Huff RN  Outcome: Completed     Problem: SKIN/TISSUE INTEGRITY - ADULT  Goal: Incision(s), wounds(s) or drain site(s) healing without S/S of infection  Description: INTERVENTIONS:  - Assess and document risk factors for pressure ulcer development  - Assess and document skin integrity  - Assess and document dressing/incision, wound bed, drain sites and surrounding tissue  - Implement wound care per orders  - Initiate isolation precautions as appropriate  - Initiate Pressure Ulcer prevention bundle as indicated  5/21/2024 1035 by Gisela Huff RN  Outcome: Completed     Problem: MUSCULOSKELETAL - ADULT  Goal: Return mobility to safest level of function  Description: INTERVENTIONS:  - Assess patient stability and activity tolerance for standing, transferring and ambulating w/ or w/o assistive devices  - Assist with transfers and ambulation using safe patient handling equipment as needed  - Ensure adequate protection for wounds/incisions during  mobilization  - Obtain PT/OT consults as needed  - Advance activity as appropriate  - Communicate ordered activity level and limitations with patient/family  5/21/2024 1035 by iGsela Huff RN  Outcome: Completed     Problem: MUSCULOSKELETAL - ADULT  Goal: Maintain proper alignment of affected body part  Description: INTERVENTIONS:  - Support and protect limb and body alignment per provider's orders  - Instruct and reinforce with patient and family use of appropriate assistive device and precautions (e.g. spinal or hip dislocation precautions)  5/21/2024 1035 by Gisela Huff RN  Outcome: Completed     Problem: Impaired Functional Mobility  Goal: Achieve highest/safest level of mobility/gait  Description: Interventions:  - Assess patient's functional ability and stability  - Promote increasing activity/tolerance for mobility and gait  - Educate and engage patient/family in tolerated activity level and precautions  5/21/2024 1035 by Gisela Huff RN  Outcome: Completed     Problem: Impaired Activities of Daily Living  Goal: Achieve highest/safest level of independence in self care  Description: Interventions:  - Assess ability and encourage patient to participate in ADLs to maximize function  - Promote sitting position while performing ADLs such as feeding, grooming, and bathing  - Educate and encourage patient/family in tolerated functional activity level and precautions during self-care  5/21/2024 1035 by Gisela Huff RN  Outcome: Completed

## 2024-05-21 NOTE — PROGRESS NOTES
[facility]  part of [facility]    Progress Note    Ray Bustillo Patient Status:  Inpatient    3/31/1945 MRN [id number]   Location [facility] 4W/SW/SE Attending Jason Palomares MD   Hosp Day # 4 PCP Marck Frias MD       Subjective:   Ray Bustillo is a(n) 79 year old male is doing well this am with pain control and working with PT.     Objective:   Vital Signs:  Blood pressure 118/51, pulse 73, temperature 98 °F (36.7 °C), temperature source Oral, resp. rate 16, height 5' 6\" (1.676 m), weight 176 lb (79.8 kg), SpO2 91%.     General: No acute distress. Alert and oriented x 3.  HEENT: Moist mucous membranes.   Neck: No lymphadenopathy.  No JVD. No carotid bruits.  Respiratory: Clear to auscultation bilaterally.  No wheezes. No rhonchi.  Cardiovascular: S1, S2.  Regular rate and rhythm.  No murmurs. Equal pulses   Abdomen: Soft, nontender, nondistended.  Positive bowel sounds. No rebound tenderness  Neurologic: No focal neurological deficits.  Musculoskeletal: Full range of motion of all extremities.  No swelling noted.  Integument: No lesions. No erythema.  Psychiatric: Appropriate mood and affect.      Assessment and Plan:     Lumbar spinal stenosis  S/P lumbar fusion       Diabetes mellitus, type 2 (HCC)        Essential hypertension        Hyperlipidemia        CAD (coronary artery disease)        JODI (obstructive sleep apnea)        S/P lumbar fusion    Plan for home today        Results:     Lab Results   Component Value Date    WBC 10.5 2024    HGB 12.7 (L) 2024    HCT 38.2 (L) 2024    .0 2024    CREATSERUM 1.06 2024    BUN 13 2024     2024    K 4.5 2024     2024    CO2 31.0 2024     (H) 2024    CA 9.6 2024    ALB 4.6 2024    ALKPHO 80 2024    BILT 0.7 2024    TP 7.1 2024    AST 25 2024    ALT 23 2024    PTT 27.4 2024    INR  1.06 05/08/2024    PSA 0.08 06/12/2023    ESRML 16 09/29/2022    CRP <0.29 09/29/2022       No results found.                Clemente Mars DO  5/21/2024

## 2024-05-21 NOTE — PROGRESS NOTES
[facility]  part of [facility]    Progress Note    Ray Bustillo Patient Status:  Inpatient    3/31/1945 MRN [id number]   Location [facility] 4W/SW/SE Attending Jason Palomares MD   Hosp Day # 4 PCP Marck Frias MD     SUBJECTIVE:  Interval History: The patient is doing well overall and had a much better night over night.  He feels his pain is under better control and feels more comfortable with transfers.  The post operative pain is well managed with the current medications.  He denies any lumbar or leg pain other than some mild soreness.   He denies any fever, chills, gross bowel/bladder incontinence or saddle anesthesia. They also deny calf pain, cramping, chest pain, difficulty breathing or shortness of breath. They are getting up for transfers and walking without difficulty. I did receive a call at 5:40am that his drain had come out overnight but had a total of 30 mL with 5 mL remaining in the hemovac this am.     Post-Op Day: 4    OBJECTIVE:  Blood pressure 129/55, pulse 73, temperature 98.1 °F (36.7 °C), temperature source Oral, resp. rate 18, height 5' 6\" (1.676 m), weight 176 lb (79.8 kg), SpO2 95%.     Ortho Spine Exam:  Incisional dressing was removed and the underlying skin is clean, dry and intact.  There is no active drainage noted and the drain site is clean and dry.  Adjacent skin is without erythema or edema.  Motor exam is 5/5 bilateral lower extremity.  2+ dorsalis pedis and posterior tibialis pulses.  Sensation is intact distally. Calves are soft and non-tender to palpation.      ASSESSMENT/PLAN:    S/P L4-S1 laminectomy, L4-5 PSF    1) Continue current pain management protocol  2) Plan to discharge home today pending clearance from the medicine/hospitalist team and PT/OT  3) Post operative medications were sent to patient's pharmacy outside of [facility]   4) PO dressing removed, okay to leave open air or with a light dry gauze or ABD dressing as needed.   5)  Follow up in clinic in 2 to 3 weeks, appointment should already be scheduled, please have patient call [phone] to confirm or change date/location.   6) All questions answered by the bedside today     Janice Toussaint PA-C  5/21/2024  7:17 AM

## 2024-05-21 NOTE — PROGRESS NOTES
[facility]  part of [facility]    Progress Note    Ray Bustillo Patient Status:  Inpatient    3/31/1945 MRN [id number]   Location [facility] 4W/SW/SE Attending Jason Palomares MD   Hosp Day # 3 PCP Marck Frias MD       Subjective:   Ray Bustillo is a(n) 79 year old male POD# 3 Feels he is doing OK. He hasn't passed PT yet but is otherwise steadily improving. No chest pain, dyspnea.     Objective:   Vital Signs:  Blood pressure 131/57, pulse 70, temperature 99.2 °F (37.3 °C), temperature source Oral, resp. rate 18, height 5' 6\" (1.676 m), weight 176 lb (79.8 kg), SpO2 92%.     General: No acute distress. Alert and oriented x 3.  HEENT: Moist mucous membranes. EOM-I. PERRL  Respiratory: Clear to auscultation bilaterally.  No wheezes. No rhonchi.  Cardiovascular: S1, S2.  Regular rate and rhythm.  No murmurs.  Abdomen: Soft, nontender, nondistended.   Neurologic: No focal neurological deficits.  Musculoskeletal: No calf tenderness.   Integument: No lesions. No erythema.  Psychiatric: Appropriate mood and affect.      Results:    Lab Results   Component Value Date    WBC 10.5 2024    HGB 12.7 (L) 2024    HCT 38.2 (L) 2024    .0 2024    CREATSERUM 1.06 2024    BUN 13 2024     2024    K 4.5 2024     2024    CO2 31.0 2024     (H) 2024    CA 9.6 2024    ALB 4.6 2024    ALKPHO 80 2024    BILT 0.7 2024    TP 7.1 2024    AST 25 2024    ALT 23 2024    PTT 27.4 2024    INR 1.06 2024    PSA 0.08 2023    ESRML 16 2022    CRP <0.29 2022         No results found.        Assessment and Plan:       Lumbar spinal stenosis        S/P lumbar fusion  Stable post op day 3. He needs to pass PT to go home, hopefully tomorrow.       Diabetes mellitus, type 2 (HCC)  resume      Essential hypertension        Hyperlipidemia         CAD (coronary artery disease)        JODI (obstructive sleep apnea)                Postoperative Recommendations:  Anticoagulation / DVT prophylaxis: SCDs, early ambulation. Resume aspirin next week  Pain Control: per ortho  GI protection: Protonix  Incentive Spirometry   Telemetry as needed  CPAP/O2 as needed     Pain management and Physical therapy as per Orthopedic service.   Home Health as needed          Get Hudson MD  5/20/2024

## 2024-05-21 NOTE — PLAN OF CARE
POD:4. Patient is A&Ox4. RA. Saline locked. General diet. Remote tele. Cpap @ night. Voiding via urinal Hemovac drain completely came out this morning, MD aware. . ACHS. PRN Oxycodone for pain management. Incentive spirometer highly encouraged.Up by 1 assist with a walker. Call light within reach, frequent rounding. Safety measures in place. Plan for HH when medically clear.             Problem: Patient Centered Care  Goal: Patient preferences are identified and integrated in the patient's plan of care  Description: Interventions:  - What would you like us to know as we care for you?   - Provide timely, complete, and accurate information to patient/family  - Incorporate patient and family knowledge, values, beliefs, and cultural backgrounds into the planning and delivery of care  - Encourage patient/family to participate in care and decision-making at the level they choose  - Honor patient and family perspectives and choices  Outcome: Progressing     Problem: Patient/Family Goals  Goal: Patient/Family Long Term Goal  Description: Patient's Long Term Goal:     Interventions:  -   - See additional Care Plan goals for specific interventions  Outcome: Progressing  Goal: Patient/Family Short Term Goal  Description: Patient's Short Term Goal:    Interventions:   -   - See additional Care Plan goals for specific interventions  Outcome: Progressing     Problem: Diabetes/Glucose Control  Goal: Glucose maintained within prescribed range  Description: INTERVENTIONS:  - Monitor Blood Glucose as ordered  - Assess for signs and symptoms of hyperglycemia and hypoglycemia  - Administer ordered medications to maintain glucose within target range  - Assess barriers to adequate nutritional intake and initiate nutrition consult as needed  - Instruct patient on self management of diabetes  Outcome: Progressing     Problem: PAIN - ADULT  Goal: Verbalizes/displays adequate comfort level or patient's stated pain goal  Description:  INTERVENTIONS:  - Encourage pt to monitor pain and request assistance  - Assess pain using appropriate pain scale  - Administer analgesics based on type and severity of pain and evaluate response  - Implement non-pharmacological measures as appropriate and evaluate response  - Consider cultural and social influences on pain and pain management  - Manage/alleviate anxiety  - Utilize distraction and/or relaxation techniques  - Monitor for opioid side effects  - Notify MD/LIP if interventions unsuccessful or patient reports new pain  - Anticipate increased pain with activity and pre-medicate as appropriate  Outcome: Progressing     Problem: RISK FOR INFECTION - ADULT  Goal: Absence of fever/infection during anticipated neutropenic period  Description: INTERVENTIONS  - Monitor WBC  - Administer growth factors as ordered  - Implement neutropenic guidelines  Outcome: Progressing     Problem: SAFETY ADULT - FALL  Goal: Free from fall injury  Description: INTERVENTIONS:  - Assess pt frequently for physical needs  - Identify cognitive and physical deficits and behaviors that affect risk of falls.  - [place] fall precautions as indicated by assessment.  - Educate pt/family on patient safety including physical limitations  - Instruct pt to call for assistance with activity based on assessment  - Modify environment to reduce risk of injury  - Provide assistive devices as appropriate  - Consider OT/PT consult to assist with strengthening/mobility  - Encourage toileting schedule  Outcome: Progressing     Problem: DISCHARGE PLANNING  Goal: Discharge to home or other facility with appropriate resources  Description: INTERVENTIONS:  - Identify barriers to discharge w/pt and caregiver  - Include patient/family/discharge partner in discharge planning  - Arrange for needed discharge resources and transportation as appropriate  - Identify discharge learning needs (meds, wound care, etc)  - Arrange for interpreters to assist at  discharge as needed  - Consider post-discharge preferences of patient/family/discharge partner  - Complete POLST form as appropriate  - Assess patient's ability to be responsible for managing their own health  - Refer to Case Management Department for coordinating discharge planning if the patient needs post-hospital services based on physician/LIP order or complex needs related to functional status, cognitive ability or social support system  Outcome: Progressing     Problem: GENITOURINARY - ADULT  Goal: Absence of urinary retention  Description: INTERVENTIONS:  - Assess patient’s ability to void and empty bladder  - Monitor intake/output and perform bladder scan as needed  - Follow urinary retention protocol/standard of care  - Consider collaborating with pharmacy to review patient's medication profile  - Implement strategies to promote bladder emptying  Outcome: Progressing     Problem: SKIN/TISSUE INTEGRITY - ADULT  Goal: Skin integrity remains intact  Description: INTERVENTIONS  - Assess and document risk factors for pressure ulcer development  - Assess and document skin integrity  - Monitor for areas of redness and/or skin breakdown  - Initiate interventions, skin care algorithm/standards of care as needed  Outcome: Progressing  Goal: Incision(s), wounds(s) or drain site(s) healing without S/S of infection  Description: INTERVENTIONS:  - Assess and document risk factors for pressure ulcer development  - Assess and document skin integrity  - Assess and document dressing/incision, wound bed, drain sites and surrounding tissue  - Implement wound care per orders  - Initiate isolation precautions as appropriate  - Initiate Pressure Ulcer prevention bundle as indicated  Outcome: Progressing     Problem: MUSCULOSKELETAL - ADULT  Goal: Return mobility to safest level of function  Description: INTERVENTIONS:  - Assess patient stability and activity tolerance for standing, transferring and ambulating w/ or w/o assistive  devices  - Assist with transfers and ambulation using safe patient handling equipment as needed  - Ensure adequate protection for wounds/incisions during mobilization  - Obtain PT/OT consults as needed  - Advance activity as appropriate  - Communicate ordered activity level and limitations with patient/family  Outcome: Progressing  Goal: Maintain proper alignment of affected body part  Description: INTERVENTIONS:  - Support and protect limb and body alignment per provider's orders  - Instruct and reinforce with patient and family use of appropriate assistive device and precautions (e.g. spinal or hip dislocation precautions)  Outcome: Progressing     Problem: Impaired Functional Mobility  Goal: Achieve highest/safest level of mobility/gait  Description: Interventions:  - Assess patient's functional ability and stability  - Promote increasing activity/tolerance for mobility and gait  - Educate and engage patient/family in tolerated activity level and precautions  Outcome: Progressing     Problem: Impaired Activities of Daily Living  Goal: Achieve highest/safest level of independence in self care  Description: Interventions:  - Assess ability and encourage patient to participate in ADLs to maximize function  - Promote sitting position while performing ADLs such as feeding, grooming, and bathing  - Educate and encourage patient/family in tolerated functional activity level and precautions during self-care    Outcome: Progressing

## 2024-05-21 NOTE — PHYSICAL THERAPY NOTE
PHYSICAL THERAPY TREATMENT NOTE - INPATIENT     Room Number: 408/408-A       Presenting Problem: s/p L4-S1 lami, L4-5 fusion       Problem List  Principal Problem:    Lumbar spinal stenosis  Active Problems:    Diabetes mellitus, type 2 (HCC)    Essential hypertension    Hyperlipidemia    CAD (coronary artery disease)    JODI (obstructive sleep apnea)    S/P lumbar fusion      PHYSICAL THERAPY ASSESSMENT   Patient demonstrates good  progress this session, goals  remain in progress.    Patient continues to function below baseline with bed mobility, transfers, and gait.  Contributing factors to remaining limitations include decreased functional strength, decreased endurance/aerobic capacity, and pain.  Next session anticipate patient to progress bed mobility, transfers, and gait.  Physical Therapy will continue to follow patient for duration of hospitalization.    Patient continues to benefit from continued skilled PT services: at discharge to promote functional independence in home.  Anticipate patient will return home with home health PT.    PLAN  PT Treatment Plan: Bed mobility;Endurance;Gait training  Frequency (Obs): Daily    SUBJECTIVE  Pt reports being ready for PT RX    OBJECTIVE  Precautions: Spine;Drain(s)    WEIGHT BEARING RESTRICTION                PAIN ASSESSMENT   Ratin  Location: low back  Management Techniques: Activity promotion    BALANCE  Static Sitting: Good  Dynamic Sitting: Fair +  Static Standing: Fair  Dynamic Standing: Fair -    ACTIVITY TOLERANCE                          O2 WALK       AM-PAC '6-Clicks' INPATIENT SHORT FORM - BASIC MOBILITY  How much difficulty does the patient currently have...  Patient Difficulty: Turning over in bed (including adjusting bedclothes, sheets and blankets)?: None   Patient Difficulty: Sitting down on and standing up from a chair with arms (e.g., wheelchair, bedside commode, etc.): A Little   Patient Difficulty: Moving from lying on back to sitting on the  side of the bed?: None   How much help from another person does the patient currently need...   Help from Another: Moving to and from a bed to a chair (including a wheelchair)?: A Little   Help from Another: Need to walk in hospital room?: A Little   Help from Another: Climbing 3-5 steps with a railing?: A Little     AM-PAC Score:  Raw Score: 20   Approx Degree of Impairment: 35.83%   Standardized Score (AM-PAC Scale): 47.67   CMS Modifier (G-Code): CJ    FUNCTIONAL ABILITY STATUS  Functional Mobility/Gait Assessment  Gait Assistance: Contact guard assist  Distance (ft): 2 x 100  Assistive Device: Rolling walker  Pattern: Shuffle  Stairs: Stairs  How Many Stairs: 12  Device: 2 Rails  Assist: Contact guard assist  Pattern: Ascend and Descend  Rolling: minimal assist  Supine to Sit: minimal assist  Sit to Supine: minimal assist  Sit to Stand: minimal assist    Additional information: Pt seen daily.Min a for bed  mobility and transfer;extra time provided to complete task.EOB sitting balance activity with emphasis on core stabilization.Spinal precautions reviewed;education.Pt amb 2 x 100 ft with RW and CGA.Navigated 12 stairs with CGA .There ex.Gentle balance activity performed to maximize safety with functional mobility.    The patient's Approx Degree of Impairment: 35.83% has been calculated based on documentation in the [facility] '6 clicks' Inpatient Daily Activity Short Form.  Research supports that patients with this level of impairment may benefit from Home with Home Health PT.  Final disposition will be made by interdisciplinary medical team.    THERAPEUTIC EXERCISES  Lower Extremity Ankle pumps  Glut sets  Hip AB/AD  Quad sets     Position Supine       Patient End of Session: Up in chair;Call light within reach;RN aware of session/findings;All patient questions and concerns addressed    CURRENT GOALS     Gait Train  Patient Goal Patient's self-stated goal is:    Goal #1 Patient will perform log roll technique at mod  I level with performance of supine<>sit   Goal #1   Current Status  Min a   Goal #2 Patient is able to demonstrate transfers Sit to/from Stand at assistance level: minimum assistance with walker - rolling      Goal #2  Current Status  Min a.   Goal #3 Patient is able to ambulate >150 feet with assist device: walker - rolling at assistance level: modified independent   Goal #3   Current Status  Pt amb 2 x 100 ft with RW and CGA   Goal #4 Patient will negotiate 2 stairs w/ assistive device and supervision   Goal #4   Current Status  Navigated 12  stairs with CGA   Goal #5 Patient to demonstrate independence with home activity/exercise instructions provided to patient in preparation for discharge.   Goal #5   Current Status  Progressing   ing: 15 minutes  Therapeutic Activity: 15 minutes  Neuromuscular Re-education:  minutes  Therapeutic Exercise: 15 minutes  Canalith Repositioning:  minutes  Manual Therapy:  minutes  Can add/delete any of these

## 2024-05-24 NOTE — DISCHARGE SUMMARY
[facility]  part of [facility]    Discharge Summary    Ray Bustillo Patient Status:  Inpatient    3/31/1945 MRN [id number]   Location [facility] 4W/SW/SE Attending No att. providers found   Hosp Day # 4 PCP Marck Frias MD     Date of Admission: 2024   Date of Discharge: 2024    Admitting Diagnosis: Lumbar radiculopathy, spinal stenosis lumbar region with neurogenic claudication  S/P lumbar fusion    Disposition: Home    Discharge Diagnosis: .Principal Problem:    Lumbar spinal stenosis  Active Problems:    Diabetes mellitus, type 2 (HCC)    Essential hypertension    Hyperlipidemia    CAD (coronary artery disease)    JODI (obstructive sleep apnea)    S/P lumbar fusion      Hospital Course:   Reason for Admission: lumbar spinal stenosis     Discharge Physical Exam:  Vital Signs:  Blood pressure 118/51, pulse 73, temperature 98 °F (36.7 °C), temperature source Oral, resp. rate 16, height 5' 6\" (1.676 m), weight 176 lb (79.8 kg), SpO2 91%.     General: No acute distress. Alert and oriented x 3.  HEENT: Moist mucous membranes. EOM-I. PERRL  Neck: No lymphadenopathy.  No JVD. No carotid bruits.  Respiratory: Clear to auscultation bilaterally.  No wheezes. No rhonchi.  Cardiovascular: S1, S2.  Regular rate and rhythm.  No murmurs. Equal pulses   Abdomen: Soft, nontender, nondistended.  Positive bowel sounds. No rebound tenderness  Neurologic: No focal neurological deficits.  Musculoskeletal: Full range of motion of all extremities.  No swelling noted.  Integument: No lesions. No erythema.  Psychiatric: Appropriate mood and affect.    Hospital Course: lumbar fusion     Complications: none    Consultants         Provider   Role Specialty     Clemente Mars DO      Consulting Physician Family Medicine     Get Hudson MD      Consulting Physician Family Medicine          Surgical Procedures       Case IDs Date Procedure Surgeon Location Status    [id number] L4-S1  laminectomy, L4-5 fusion Jason Palomares MD [facility] MAIN OR Comp              Discharge Plan:   Discharge Condition: Stable    Discharge Medication List as of 5/21/2024 10:37 AM        New Orders    Details   traMADol 50 MG Oral Tab Take 1-2 tablets ( mg total) by mouth every 6 (six) hours as needed., Print Script, Disp-40 tablet, R-0           Home Meds - Unchanged    Details   acetaminophen 500 MG Oral Tab Take 1 tablet (500 mg total) by mouth every 6 (six) hours as needed for Pain., Historical      Multiple Vitamins-Minerals (MULTI-VITAMIN/MINERALS) Oral Tab Take 1 tablet by mouth daily., Historical      cholecalciferol 50 MCG (2000 UT) Oral Tab Take 2 tablets (4,000 Units total) by mouth daily., Historical      atenolol 50 MG Oral Tab Take 0.5 tablets (25 mg total) by mouth at bedtime., Historical, R-0      Isosorbide Mononitrate ER 60 MG Oral Tablet 24 Hr Take 1 tablet (60 mg total) by mouth daily., Historical      valsartan 160 MG Oral Tab Take 1 tablet (160 mg total) by mouth daily., Historical      nitroGLYCERIN 0.4 MG Sublingual SL Tab Place 1 tablet (0.4 mg total) under the tongue every 5 (five) minutes as needed for Chest pain., Print Script, Disp-30 tablet, R-1      Probiotic Product (PROBIOTIC ADVANCED OR) Take by mouth daily.  , Historical      Rosuvastatin Calcium 40 MG Oral Tab Take 0.5 tablets (20 mg total) by mouth nightly., Historical      gabapentin 600 MG Oral Tab Take 1 tablet (600 mg total) by mouth 3 (three) times daily. Take 600 mg twice daily and 900 mg HS, Historical      Coenzyme Q10 (CO Q-10) 300 MG Oral Cap Take 300 mg by mouth daily., Historical      Oxybutynin Chloride ER (DITROPAN-XL) 10 MG Oral Take 1 tablet (10 mg total) by mouth daily., Historical      ALFUZOSIN HCL 10 MG OR TB24 Take 1 tablet (10 mg total) by mouth at bedtime., Historical      METFORMIN  MG (MOD) OR TB24 Take 500 tablets by mouth daily., Historical                 Discharge Diet: As  tolerated    Discharge Activity: As tolerated    Follow up:      Follow-up Information       Marck Frias MD Follow up.    Specialties: Internal Medicine, IP Consult to Primary Care  Contact information:  [address]  [facility] [address] [phone]               Jason Palomares MD. Schedule an appointment as soon as possible for a visit in 2 week(s).    Specialties: Surgery, Orthopaedic, SURGERY, ORTHOPEDIC  Contact information:  [address]  [facility] [id number] [phone]                             Follow up Labs: as needed         Other Discharge Instructions:         Sometimes managing your health at home requires assistance.  The [facility] team has recognized your preference to use [facility] (previously known as [facility]), Phone: [phone] or Fax: [phone]. A representative from the home health agency will contact you or your family to schedule your first visit.         Discharge Instructions Dr. Jason Palomares  POSTERIOR LUMBAR INTERBODY FUSION (TLIF),  ANTERIOR LUMBAR INTERBODY FUSION (ALIF),  EXTREME LATERAL INTERBODY FUSION (XLIF)  POSTERIOLATERAL FUSION (PLF)    Wound Care  Do not change or remove your initial postoperative dressing for the first 2 days after surgery   unless instructed to do so by Dr. Palomares's staff or if it is saturated by drainage. If removed,   please reapply a clean dry dressing over the incision. You may have thin adhesive paper strips   on your incision after surgery--please do NOT remove them. They will be removed at your 2-  week postoperative appointment. You may remove your dressing and shower on the third day   after surgery. Let warm soapy water run over the incisional area. Do not scrub the area.   Gently pat the incisional area completely dry. You may reapply a clean dry dressing over the   incision to prevent irritation from clothing, but this is not required after post op day 3. You are    not allowed to soak your incision in a bathtub, hot tub, or swimming pool until the incision is   completely healed and Dr. Palomares or his staff permit these activities. Do not apply antibiotic   gels, ointments (Neosporin or bacitracin), lotions, peroxide or iodine solutions on or around the   Incision.  You may have some swelling and/or clear yellow drainage around your incisional site. This is   normal and may take several weeks to go away. Please leave any superficial scabs alone and let   them fall off on their own.    Immediate Follow Up  If you notice incisional redness or increased swelling, continuous clear drainage or change in   color to the drainage, malodor, significant leg swelling, increased pain and/or a fever greater   than 101.5, you should call our office. If it is after business hours you should present to the   closest emergency room. If you have shortness of breath, chest pain, significant stomach pain   and bloating without a bowel movement, complete loss of bowel or bladder function please   contact our office and present to your local emergency room as soon as possible.    Post-Operative Instructions - Lumbar  After surgery you may experience pain in or around the region of the incision. Some buttock   and leg pain as well as tingling or numbness may also be present. Initially it may be of greater   intensity than before surgery but will usually subside over time as the healing process occurs.   This discomfort is caused from surgical retraction of tissue as well as inflammation and swelling   of previously compressed nerves.  Early activity after surgery is extremely important to help prevent the complications such as   pneumonia and blood clots. Activity also promotes recovery, relieves muscle stiffness, allows   for development of a well-organized scar, and improves your outcome. Your recovery is an   essential part of the surgical process so please follow the guidelines below to return  to your   desired activities as safely as possible.     Week 1  ? Try to get at least 8 hours of sleep each night. A disrupted sleep pattern is common   after discharge from the hospital and will return to normal over time.  ? Avoid bending and twisting at the waist. No bending more than what is required to get   dressed. No twisting more than required to toilet (wipe yourself).  ? No sitting for more than 1 hour at a time. Walk and/or change position before returning   to a sitting position.   ? You may not drive, but you may be driven.  ? Begin a daily walking program with 1-2 blocks initially; schedule a daily time and   increase distance daily. Ideally, we would like you to be up and walking 15   minutes/hour.   ? Eat a balanced high-fiber diet and drink plenty of water.  ? Take medications as prescribed, using narcotics and muscle relaxants only as needed.  ? Do not restart any NSAIDs such as Advil, Motrin, Aleve, ibuprofen, naproxen, diclofenac,   meloxicam or celecoxib for at least 3 months after your fusion surgery.   ? Take stool softeners to help with bowel movements.    Week 2  ? Resume normal rising and retiring schedule but continue to rest throughout the day as   needed.  ? You may not drive, but you may be driven.  ? No lifting of anything weighing more than 10 to 15 pounds.  ? Continue scheduled walking, increasing distance and frequency as tolerated.  ? May resume sexual relations when comfortable between 2 to 4 weeks after surgery.  Please be advised the patient must be in the top position until 6 weeks after surgery.   ? Begin weaning from narcotic pain medications if you have not already.  ? Follow-up in the office as scheduled for further instructions.     Disability  The usual period of recovery for Lumbar Spinal Fusion surgery is 8 to 12 weeks and complete   healing may take from 6 to 9 months. Some patients may return to work sooner than others   depending on their job, response to surgery,  and ability to perform other lighter tasks in the   workplace. Physician approval is required prior to returning to work.    Post-Operative Pain Medication Policy  It is Dr. Palomares's aim to make you as comfortable as possible after surgery. We realize pain   management is not perfect, and that you will have some discomfort after your operation.   There are several factors that limit our ability to completely eliminate pain after surgery. This   first is that pain medications have side effects. Please be advised that high doses or continued   use of narcotic medications may put you at risk for serious health issues including but not   limited to respiratory depression (decreased ability to breathe normally), hypotension (low   blood pressure), nausea and constipation, generalized itching, urinary retention (inability to   urinate) and abdominal distention.   Dr. Palomares will prescribe pain medication for 2-3 weeks after surgery and may refer you out to a   pain management specialist for any narcotic medication requested after this period of time.    Preventing Opioid Addiction  [facility] is committed to protecting our patients from Opioid addiction.   We only prescribe opioids when necessary. Whenever possible, we use less-addictive pain   medication and alternative pain management options. Both high-dosage opioids and lowdosage opioids taken over long periods of time can increase the risk of addiction. We attempt to limit our prescriptions of opioids after surgery as much as possible, which may include lower   dosages of opioid medications or fewer pills. If you have additional questions about Opioids   and their dependency, please contact our clinic.    Contact Information  Please contact Dr. Jelani Rapp’s , at [phone] with any   questions or concerns pertaining to scheduling or other administrative issues.  Please contact Dr. Jelani Andrews PA-C’s physician  assistant, at [phone] with   any medical questions or concerns.     AFTER HOURS EMERGENCY CONTACT: Please dial [phone] and press “0”  for the  to connect you to the orthopaedic fellow or resident on call if you have any   urgent questions or concerns after regular business hours. If you do not hear back from the oncall physician in a timely matter and your urgent matter turns emergent, you should present to   your local emergency room. Please follow-up with Janice Toussaint PA-C the following business   day with an update if you do have to contact the on-call physician or present to your local   emergency room after surgery.     Future Dental Appointments  (3 Months following surgery): Prior to going to the dentist, please notify Dr. Palomares and let your   Dentist know that you had a spinal fusion surgery. Have the treating physician prescribe   antibiotics prior to the procedure. The appropriate medications and guidelines are as follows:  ? Amoxicillin 2 grams 1 hour prior to procedure. If allergic to Penicillin, Clindamycin 600   mg, 1 hour prior to procedure. No second doses are required following the dental   procedure          Clemente Mars, DO  5/24/2024

## 2024-06-03 PROBLEM — Z85.46 HISTORY OF MALIGNANT NEOPLASM OF PROSTATE: Status: ACTIVE | Noted: 2024-06-03

## 2024-06-03 PROBLEM — E11.65 TYPE 2 DIABETES MELLITUS WITH HYPERGLYCEMIA  (CMD): Status: ACTIVE | Noted: 2024-06-03

## 2024-06-03 PROBLEM — N13.8 BENIGN PROSTATIC HYPERPLASIA WITH URINARY OBSTRUCTION: Status: ACTIVE | Noted: 2024-06-03

## 2024-06-03 PROBLEM — I25.10 ATHEROSCLEROSIS OF CORONARY ARTERY: Chronic | Status: ACTIVE | Noted: 2023-01-11

## 2024-06-03 PROBLEM — E11.40 DIABETIC NEUROPATHY ASSOCIATED WITH TYPE 2 DIABETES MELLITUS  (CMD): Status: ACTIVE | Noted: 2017-08-29

## 2024-06-03 PROBLEM — Z98.890 HISTORY OF LUMBAR LAMINECTOMY: Status: ACTIVE | Noted: 2024-06-03

## 2024-06-03 PROBLEM — M48.062 SPINAL STENOSIS OF LUMBAR REGION WITH NEUROGENIC CLAUDICATION: Status: ACTIVE | Noted: 2024-06-03

## 2024-06-03 PROBLEM — G47.33 OBSTRUCTIVE SLEEP APNEA: Chronic | Status: ACTIVE | Noted: 2023-01-11

## 2024-06-03 PROBLEM — N40.1 BENIGN PROSTATIC HYPERPLASIA WITH URINARY OBSTRUCTION: Status: ACTIVE | Noted: 2024-06-03

## 2024-06-03 PROBLEM — I10 ESSENTIAL (PRIMARY) HYPERTENSION: Status: ACTIVE | Noted: 2024-06-03

## 2024-06-03 PROBLEM — Z86.79 HISTORY OF SUPRAVENTRICULAR TACHYCARDIA: Status: ACTIVE | Noted: 2024-06-03

## 2024-06-11 ENCOUNTER — APPOINTMENT (OUTPATIENT)
Dept: NEUROLOGY | Age: 79
End: 2024-06-11
Attending: INTERNAL MEDICINE

## 2025-06-16 ENCOUNTER — APPOINTMENT (OUTPATIENT)
Dept: CT IMAGING | Facility: HOSPITAL | Age: 80
End: 2025-06-16
Attending: EMERGENCY MEDICINE
Payer: MEDICARE

## 2025-06-16 ENCOUNTER — HOSPITAL ENCOUNTER (EMERGENCY)
Facility: HOSPITAL | Age: 80
Discharge: HOME | End: 2025-06-16
Attending: EMERGENCY MEDICINE
Payer: MEDICARE

## 2025-06-16 VITALS
HEIGHT: 66 IN | DIASTOLIC BLOOD PRESSURE: 60 MMHG | TEMPERATURE: 100 F | HEART RATE: 90 BPM | RESPIRATION RATE: 18 BRPM | OXYGEN SATURATION: 97 % | BODY MASS INDEX: 27.8 KG/M2 | WEIGHT: 173 LBS | SYSTOLIC BLOOD PRESSURE: 128 MMHG

## 2025-06-16 DIAGNOSIS — N12 PYELONEPHRITIS: Primary | ICD-10-CM

## 2025-06-16 LAB
ANION GAP SERPL CALC-SCNC: 7 MMOL/L (ref 0–18)
BASOPHILS # BLD AUTO: 0.02 X10(3) UL (ref 0–0.2)
BASOPHILS NFR BLD AUTO: 0.1 %
BILIRUB UR QL: NEGATIVE
BUN BLD-MCNC: 20 MG/DL (ref 9–23)
BUN/CREAT SERPL: 18.9 (ref 10–20)
CALCIUM BLD-MCNC: 9 MG/DL (ref 8.7–10.4)
CHLORIDE SERPL-SCNC: 103 MMOL/L (ref 98–112)
CO2 SERPL-SCNC: 25 MMOL/L (ref 21–32)
COLOR UR: YELLOW
CREAT BLD-MCNC: 1.06 MG/DL (ref 0.7–1.3)
DEPRECATED RDW RBC AUTO: 44.1 FL (ref 35.1–46.3)
EGFRCR SERPLBLD CKD-EPI 2021: 71 ML/MIN/1.73M2 (ref 60–?)
EOSINOPHIL # BLD AUTO: 0.02 X10(3) UL (ref 0–0.7)
EOSINOPHIL NFR BLD AUTO: 0.1 %
ERYTHROCYTE [DISTWIDTH] IN BLOOD BY AUTOMATED COUNT: 13.9 % (ref 11–15)
GLUCOSE BLD-MCNC: 149 MG/DL (ref 70–99)
GLUCOSE UR-MCNC: 150 MG/DL
HCT VFR BLD AUTO: 41.3 % (ref 39–53)
HGB BLD-MCNC: 13.9 G/DL (ref 13–17.5)
IMM GRANULOCYTES # BLD AUTO: 0.06 X10(3) UL (ref 0–1)
IMM GRANULOCYTES NFR BLD: 0.4 %
KETONES UR-MCNC: NEGATIVE MG/DL
LEUKOCYTE ESTERASE UR QL STRIP.AUTO: 500
LYMPHOCYTES # BLD AUTO: 0.89 X10(3) UL (ref 1–4)
LYMPHOCYTES NFR BLD AUTO: 5.6 %
MCH RBC QN AUTO: 29 PG (ref 26–34)
MCHC RBC AUTO-ENTMCNC: 33.7 G/DL (ref 31–37)
MCV RBC AUTO: 86 FL (ref 80–100)
MONOCYTES # BLD AUTO: 1.3 X10(3) UL (ref 0.1–1)
MONOCYTES NFR BLD AUTO: 8.2 %
NEUTROPHILS # BLD AUTO: 13.53 X10 (3) UL (ref 1.5–7.7)
NEUTROPHILS # BLD AUTO: 13.53 X10(3) UL (ref 1.5–7.7)
NEUTROPHILS NFR BLD AUTO: 85.6 %
NITRITE UR QL STRIP.AUTO: NEGATIVE
OSMOLALITY SERPL CALC.SUM OF ELEC: 285 MOSM/KG (ref 275–295)
PH UR: 5.5 [PH] (ref 5–8)
PLATELET # BLD AUTO: 180 10(3)UL (ref 150–450)
POTASSIUM SERPL-SCNC: 3.8 MMOL/L (ref 3.5–5.1)
PROT UR-MCNC: 30 MG/DL
RBC # BLD AUTO: 4.8 X10(6)UL (ref 3.8–5.8)
RBC #/AREA URNS AUTO: >10 /HPF
SODIUM SERPL-SCNC: 135 MMOL/L (ref 136–145)
SP GR UR STRIP: >1.03 (ref 1–1.03)
UROBILINOGEN UR STRIP-ACNC: NORMAL
WBC # BLD AUTO: 15.8 X10(3) UL (ref 4–11)
WBC #/AREA URNS AUTO: >50 /HPF

## 2025-06-16 PROCEDURE — 99284 EMERGENCY DEPT VISIT MOD MDM: CPT

## 2025-06-16 PROCEDURE — 74177 CT ABD & PELVIS W/CONTRAST: CPT | Performed by: EMERGENCY MEDICINE

## 2025-06-16 PROCEDURE — 80048 BASIC METABOLIC PNL TOTAL CA: CPT | Performed by: EMERGENCY MEDICINE

## 2025-06-16 PROCEDURE — 87040 BLOOD CULTURE FOR BACTERIA: CPT | Performed by: EMERGENCY MEDICINE

## 2025-06-16 PROCEDURE — 96365 THER/PROPH/DIAG IV INF INIT: CPT

## 2025-06-16 PROCEDURE — 36415 COLL VENOUS BLD VENIPUNCTURE: CPT

## 2025-06-16 PROCEDURE — 81001 URINALYSIS AUTO W/SCOPE: CPT | Performed by: EMERGENCY MEDICINE

## 2025-06-16 PROCEDURE — 85025 COMPLETE CBC W/AUTO DIFF WBC: CPT | Performed by: EMERGENCY MEDICINE

## 2025-06-16 PROCEDURE — 99285 EMERGENCY DEPT VISIT HI MDM: CPT

## 2025-06-16 PROCEDURE — 87086 URINE CULTURE/COLONY COUNT: CPT | Performed by: EMERGENCY MEDICINE

## 2025-06-16 NOTE — ED INITIAL ASSESSMENT (HPI)
Pt is being tx for a UTI and is taking antibiotics. Pt reports fever of 100.7. Pt c/o suprapubic pain. +hematuria (dark red) --- pt reports taking ASA.  + nausea.

## 2025-06-17 NOTE — ED PROVIDER NOTES
Patient Seen in: [facility] Emergency Department    History     Chief Complaint   Patient presents with    Fever       HPI        History is provided by patient/independent historian: Patient   80 year old male with history of diabetes, CAD, hypertension, hyperlipidemia, prostate cancer, here with concern for suprapubic pain, hematuria, no fever of 100.7.  He was recently started on cefadroxil for UTI by his primary care doctor, once he spiked a fever, he decided to come in for further evaluation.  He was being seen by his urologist for microscopic hematuria and has an outpatient CT urogram coming up.    History reviewed. Past Medical History[1]      History reviewed. Past Surgical History[2]      Home Medications reviewed :  Prescriptions Prior to Admission[3]      History reviewed. Social Hx on file[4]      ROS  Review of Systems   Constitutional:  Positive for fever.   Respiratory:  Negative for shortness of breath.    Cardiovascular:  Negative for chest pain.   Genitourinary:  Positive for difficulty urinating and hematuria.   All other systems reviewed and are negative.     All other pertinent organ systems are reviewed and are negative.      Physical Exam     ED Triage Vitals [06/16/25 1801]   /60   Pulse 92   Resp 22   Temp 98.8 °F (37.1 °C)   Temp src Oral   SpO2 96 %   O2 Device None (Room air)     Vital signs reviewed.      Physical Exam  Vitals and nursing note reviewed.   Cardiovascular:      Pulses: Normal pulses.   Pulmonary:      Effort: No respiratory distress.   Abdominal:      General: There is no distension.   Musculoskeletal:      Comments: R CVA tenderness   Neurological:      Mental Status: He is alert.             ED Course       Labs:     Labs Reviewed   URINALYSIS WITH CULTURE REFLEX - Abnormal; Notable for the following components:       Result Value    Clarity Urine Turbid (*)     Spec Gravity >1.030 (*)     Glucose Urine 150 (*)     Blood Urine 3+ (*)     Protein Urine 30  (*)     Leukocyte Esterase Urine 500 (*)     WBC Urine >50 (*)     RBC Urine >10 (*)     Squamous Epi. Cells Few (*)     All other components within normal limits   CBC WITH DIFFERENTIAL WITH PLATELET - Abnormal; Notable for the following components:    WBC 15.8 (*)     Neutrophil Absolute Prelim 13.53 (*)     Neutrophil Absolute 13.53 (*)     Lymphocyte Absolute 0.89 (*)     Monocyte Absolute 1.30 (*)     All other components within normal limits   BASIC METABOLIC PANEL (8) - Abnormal; Notable for the following components:    Glucose 149 (*)     Sodium 135 (*)     All other components within normal limits   URINE CULTURE, ROUTINE   BLOOD CULTURE   BLOOD CULTURE         My EKG Interpretation:   As reviewed and Interpreted by me      Imaging Results Available and Reviewed while in ED:   CT ABDOMEN+PELVIS(CONTRAST ONLY)(CPT=74177)  Result Date: 6/16/2025  CONCLUSION:  1. Diffuse bladder wall thickening, concerning for cystitis.  2. Nonspecific heterogeneous enhancement of the kidneys with mild bilateral collecting system prominence. Please note that pyelonephritis is a largely clinical diagnosis, and imaging studies have a limited role and clinical utility for this particular indication.  3. Uncomplicated distal colonic diverticulosis.  4. Posterior spinal fusion hardware at L4-L5 with associated decompressive laminectomy defect.   5. Prostatic brachytherapy seeds are evident.  6. Lesser incidental findings as above.    Dictated by (CST): Eric Washington MD on 6/16/2025 at 9:16 PM     Finalized by (CST): Eric Washington MD on 6/16/2025 at 9:22 PM          My review and independent interpretation of CT images: no free air. Radiology report corroborates this in addition to other details as reported by them.      Decision rules/scores evaluated: none      Diagnostic labs/tests considered but not ordered: none    ED Medications Administered:   Medications   cefTRIAXone (Rocephin) 1 g in sodium chloride 0.9% 100 mL  IVPB-ADDV (has no administration in time range)   iopamidol 76% (ISOVUE-370) injection for power injector (80 mL Intravenous Given 6/16/25 2048)                MDM       Medical Decision Making      Differential Diagnosis: After obtaining the patient's history, performing the physical exam and reviewing the diagnostics, multiple initial diagnoses were considered based on the presenting problem including nephrolithiasis, pyelonephritis, UTI, viral syndrome    External document review: I personally reviewed available external medical records for any recent pertinent discharge summaries, testing, and procedures - the findings are as follows: 6/14/25 visit with Dr. Frias for gross hematuria    Complicating Factors: The patient already  has a past medical history of Back problem, Coronary atherosclerosis, Diabetes ([facility]), Diabetes type 2, controlled ([facility]) (08/29/2017), Esophageal reflux, Hearing impairment, Heart attack ([facility]), High blood pressure, High cholesterol, Histoplasmosis (08/29/2017), Neuropathy, Osteoarthritis, Other and unspecified hyperlipidemia, Precordial pain (12/08/2016), Prostate CA ([facility]) (08/29/2017), Unspecified sleep apnea, Urinary leakage (08/29/2017), Visual impairment, and VT (ventricular tachycardia) ([facility]) (12/08/2016). to contribute to the complexity of this ED evaluation.    Procedures performed: none    Discussed management with physician/appropriate source: none    Considered admission/deescalation of care for: fever    Social determinants of health affecting patient care: none    Prescription medications considered: discussed continuing current medication regimen, tylenol for fever    The patient requires continuous monitoring for: hematuria, fever    Shared decision making: discussed possible admission        Disposition and Plan     Clinical Impression:  1. Pyelonephritis        Disposition:  Discharge    Follow-up:  Bryn Wilson MD  [address]  [address]  [phone]    Follow up        Medications Prescribed:  Current Discharge Medication List                         [1]   Past Medical History:   Back problem    Coronary atherosclerosis    Diabetes (HCC)    Diabetes type 2, controlled (HCC)    Esophageal reflux    Hearing impairment    hearing aid    Heart attack (HCC)    silent    High blood pressure    High cholesterol    Histoplasmosis    Neuropathy    Osteoarthritis    Other and unspecified hyperlipidemia    Precordial pain    Prostate CA (HCC)    Hx of seed implants    Unspecified sleep apnea    CPAP    Urinary leakage    Visual impairment    glasses    VT (ventricular tachycardia) (HCC)   [2]   Past Surgical History:  Procedure Laterality Date    Hernia surgery Bilateral     inguinal    Other surgical history  ,     B/L shoulder RCR    Other surgical history      Rt lung surgery lesions exc    Other surgical history      angiograms    Other surgical history      bx and prostate seed implants    Other surgical history Right     dislocation shoulder with reduction    Total knee replacement Right     Total shoulder arthroplasty Left     x2   [3] (Not in a hospital admission)   [4]   Social History  Socioeconomic History    Marital status:    Tobacco Use    Smoking status: Former     Current packs/day: 0.00     Types: Cigarettes     Start date: 1965     Quit date: 1981     Years since quittin.4     Passive exposure: Past    Smokeless tobacco: Never   Vaping Use    Vaping status: Never Used   Substance and Sexual Activity    Alcohol use: No    Drug use: No

## 2025-06-17 NOTE — DISCHARGE INSTRUCTIONS
Compete your antibiotics as prescribed. Take tylenol for fever. Return with any worsening symptoms.

## 2025-06-17 NOTE — H&P (VIEW-ONLY)
Ray Bustillo is a 80 year old male.  Patient with microscopic hematuria, history of prostate cancer with treatment with brachy therapy and now with history of urinary tract infection with fever in the past. Here for cystoscopy, bilateral retrograde pyelograms, possible biopsy.  HPI:   No chief complaint on file.  I saw Tavon in the office on 6/13/25. He is 80-year-old [place]  who was exposed to Agent Orange in his developed prostate cancer. This was treated successfully with a seed implantation and he currently has no evidence of recurrent disease. His last PSA 0. 09 his creatinine is 1.04 1/4/1725. A urinalysis then showed no blood in the urine.. At the time of his visit he denied flank or abdominal pain. Denies hematuria or dysuria. His stream is good any feels empty when done voiding. He has nocturia 2 times per night. He denies frequency. His international prostate symptom scores 9/35.  He called our office on 6/16/25 and stated he had a fever and burning when he urinated. He went to the emergency room and they gave him antibiotics and he went home. He had an elevated white blood cell count on a blood test done in the emergency room. Last year he had a laminectomy. He's also had repair of his shoulder. He takes alfuzosin for treatment of a mild obstructive uropathy. He also takes Ditropan XL 10 mg daily which helps with frequency and urgency. He has type II diabetes. We discussed fall prevention in the past.    History provided by Dr. Wilson    Principle Problem:   <principal problem not specified>    Active Problem:   Problem List[1]      HISTORY:  Past Medical History[2]   Past Surgical History[3]   Family History[4]   Social History: Short Social Hx on File[5]     Medications :  Current Hospital Medications[6]    Taking an antibiotic  See nurses notes    Allergies:  Allergies[7]  Allergen  Reactions    Amoxicillin  RASH    Amoxicillin-Pot Clavulanate  RASH    Cholera Toxin B Subunit  Recombinant  RASH      ROS:   CONSTITUTIONAL: denies fever, chills  ENT: denies sore throat, nasal drainage/congestion  CHEST/CVS: denies cough, sputum, trouble breathing/SOB, chest pain, CUELLAR. Histoplasmosis of his right lung. Uses a CPAP. History of cardiac angiogram an elevated cholesterol.  GI: denies abdominal pain, heartburn, diarrhea, blood in bm, tarry bm,   pt has a history of constipation,    : denies difficulty urinating, pain, blood in urine, or frequencyP,rostate cancer and urinary tract infections.  SKIN: denies any unusual skin lesions or rashes  MUSCULOSKELETAL: denies back pain, joint pain, leg swelling.History of shoulder surgery.  NEURO/EYES: denies headaches, passing out, motor dysfunction, difficulty walking, difficulty with speech, temporary blindness, double vision, confusion  Hematologic/lymphatic: negative  Behavioral/Psych: negative  Endocrine:Type II diabetes  Allergic/Immunologic:negative    PHYSICAL EXAM:   Blood pressures 110/60, pulse 60, respiratory rate 16, temperatures 97  Ht 5' 6\" (1.676 m)   Wt 172 lb (78 kg)   BMI 27.76 kg/m²   GENERAL: alert and orientated X 3, well developed, well nourished, in no apparent distress  HEENT: ears and throat are clear, eyes normal, nose normal, mouth normal  NECK: supple, no lymphadenopathy, thyroid wnl, neck normal  RESPIRATORY: no rales, rhonchi, or wheezes B, lungs clear  CARDIO: RRR without murmur, no murmur, no gallop, normal S1 and S2  ABDOMEN: soft, non-tender with no palpable aneurysm or masses, liver and spleen normal  BACK: normal, no tenderness  FLANK: normal  SKIN: no rashes, no suspicious lesions, warm and dry  EXTREMITIES: no edema, full range of motion, no tenderness, pulse normal  NEURO/PSYCH: orientated x3, normal mood and affect, no sensory or motor deficit, muscle strength normal  : penis normal, testicles normal, scrotum normal, perineum normal  His physical exam was unchanged. He is numbness in his bilateral hands and  feet. He had a 5 gm prostate which was smooth, no nodules, nontender and symmetric. His stool was guaiac negative. His urinalysis showed 3+ blood, one plus leukocytes, and trace protein but it looked clear.  Laboratory Data:  No results for input(s): \"URINE\", \"CULTI\", \"BLDSMR\" in the last 168 hours.    Chemistries:  Lab Results   Component Value Date    CREATSERUM 1.06 06/16/2025       Estimated Creatinine Clearance: 49.3 mL/min (based on SCr of 1.06 mg/dL).    Lab Results   Component Value Date    BUN 20 06/16/2025     (L) 06/16/2025    K 3.8 06/16/2025     06/16/2025    CO2 25.0 06/16/2025     (H) 06/16/2025    CA 9.0 06/16/2025    ALB 4.6 05/08/2024    ALKPHO 80 05/08/2024    TP 7.1 05/08/2024    AST 25 05/08/2024    ALT 23 05/08/2024         Hematology:  Lab Results   Component Value Date    WBC 15.8 (H) 06/16/2025    HGB 13.9 06/16/2025    HCT 41.3 06/16/2025    .0 06/16/2025         Coags:  Lab Results   Component Value Date    PTT 27.4 05/08/2024    INR 1.06 05/08/2024         Lab Results   Component Value Date    PSA 0.08 06/12/2023           Recent Labs   Lab 06/16/25 1956   COLORUR Yellow   CLARITY Turbid*   SPECGRAVITY >1.030*   GLUUR 150*   BILUR Negative   KETUR Negative   BLOODURINE 3+*   PHURINE 5.5   PROUR 30*   UROBILINOGEN Normal   NITRITE Negative   LEUUR 500*   WBCUR >50*   RBCUR >10*   BACUR None Seen   EPIUR Few*       No results for input(s): \"URINE\", \"CULTI\", \"BLDSMR\" in the last 168 hours.       Imaging:    CT ABDOMEN+PELVIS(CONTRAST ONLY)(CPT=74177)  Result Date: 6/16/2025  PROCEDURE: CT ABDOMEN + PELVIS (CONTRAST ONLY) (CPT=74177)  COMPARISON: US BLADDER ONLY (CPT=76857), 5/10/2019, 4:31 PM.  US BLADDER ONLY (CPT=76857), 10/10/2016, 4:05 PM.  US KIDNEY, 6/01/2012, 10:59 AM.  US URINARY BLADDER, 4/06/2015, 1:01 PM.  US URINARY BLADDER, 6/01/2012, 10:53 AM.  US URINARY BLADDER, 2/23/2012, 10:56 AM.   URINARY BLADDER, 6/13/2011, 8:16 AM.  INDICATIONS: Fever;  urinary tract infection.  TECHNIQUE: Multidetector CT images of the abdomen and pelvis were obtained with non-ionic intravenous contrast material. Automated exposure control for dose reduction was used. Adjustment of the mA and/or kV was done based on the patient's size. Iterative reconstruction technique for dose reduction was employed. Dose information was transmitted to the ACR (American College of Radiology) NRDR (National Radiology Data Registry), which includes the Dose Index Registry. Oral contrast was not ingested.  FINDINGS: LUNG BASES: The heart is normal in size. Pericardial fluid is partially delineated. There is dependent subsegmental atelectasis bilaterally. Additional scattered ground-glass and reticular opacities are present and may be atelectatic in origin. Scattered  pulmonary interstitial reticulation is demonstrated peripherally. LIVER: No enlargement, atrophy, abnormal density, or significant focal lesion is identified.  BILIARY: The gallbladder is present. PANCREAS: There is mild diffuse fatty replacement without discernible lesion, fluid collection, or ductal dilatation. SPLEEN: No enlargement.  ADRENALS:   No defined mass or abnormal enlargement.  KIDNEYS:   Heterogeneous enhancement of the kidneys is seen bilaterally with prominence of the collecting systems bilaterally. Mild bilateral perinephric fat stranding is perceived.  GI/MESENTERY:  A small hiatal hernia is evident. Apparent gastric wall thickening is likely secondary to underdistention. There is no evidence of bowel obstruction. A normal caliber appendix is seen without inflammatory manifestations. A heavy stool burden is demonstrated. Scattered colonic diverticula are present in the descending and sigmoid colon. There is no colonic wall thickening or pericolonic fat stranding.  URINARY BLADDER: No visible calculus. There is circumferential bladder wall thickening out of proportion to the degree of distention. Surrounding  perivesical infiltration is perceived. PELVIC NODES: No lymphadenopathy.   PELVIC ORGANS: Extensive prostatic brachytherapy seeds are perceived. Bilateral hydroceles are noted. VASCULATURE:   Heavy atherosclerotic vascular calcifications of the abdominal aorta are observed. No aneurysm is detected. RETROPERITONEUM: No mass or lymphadenopathy is apparent.  BONES:   Bilateral transpedicular screws are present at L4 and L5 with longitudinally oriented posterior fusion rods. Decompressive laminectomy defects are present. There is mild anterolisthesis of L4 on L5. Multilevel degenerative disease is seen with associated vacuum disc phenomenon at the lumbosacral junction.  Anterior osteophyte formation is present. 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 Degenerative changes of the sacroiliac joints are seen with sclerosis. There are degenerative changes of the hips and pubic symphysis. 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 ABDOMINAL WALL: Postprocedural changes of bilateral inguinal herniorrhaphy are suspected with probable Prolene mesh plug material in place; there may be extension into the inguinal canal on the right. OTHER: No free air or fluid is seen in the abdomen or pelvis.           CONCLUSION:  1. Diffuse bladder wall thickening, concerning for cystitis.  2. Nonspecific heterogeneous enhancement of the kidneys with mild bilateral collecting system prominence. Please note that pyelonephritis is a largely clinical diagnosis, and imaging studies have a limited role and clinical utility for this particular indication.  3. Uncomplicated distal colonic diverticulosis.  4. Posterior spinal fusion hardware at L4-L5 with associated decompressive laminectomy defect.   5. Prostatic brachytherapy seeds are evident.  6. Lesser incidental findings as above.    Dictated by (CST): Eric Washington MD on 6/16/2025 at 9:16 PM     Finalized by (CST): Eric Washington MD on 6/16/2025 at 9:22 PM               Review of previous records: reviewed- if available        ASSESSMENT/PLAN:   Assessment   My assessment is he is a history of prostate cancer treated with brachy therapy in 2011 and he has no evidence of recurrent disease. He had microscopic hematuria in the office on 6/13/25. Then on 6/16/25 developed symptoms consistent with urinary tract infection and had went to the emergency room. He has nocturia. He has a mild obstructive uropathy. He occasionally gets constipated. He has diabetes. He has erectile dysfunction and declines Viagra.    Principle Problem:   <principal problem not specified>    Active Problem:   Problem List[8]    RECOMMENDATIONS AND TREATMENT PLAN:      I recommended a CT urogram, a cystoscopy, bilateral retrograde pyelogram, and possible biopsies to evaluate his microscopic hematuria. He will take Duricef 500 mg 2 hours prior to the procedure. He will maintain a bowel program. The patient was counseled to limit evening fluid intake, stop fluid intake 3 hours prior to bedtime as well as void at bedtime. Also, if there is any swelling in the legs, the lower extremities should be elevated starting a few hours before bedtime. He'll drink more water, less bladder irritants, and do time voiding. He will continue to do Kegel exercises and urge suppression techniques. He will continue alfuzosin. He will take Metamucil PRN.     I explained to him the procedure, risks, and complications. Including but not limited to bleeding, infection, surgical risks, anesthetic risks, and cardiac risks. He understands and accepts and desires us to proceed.        MAURICIO KERN MD      Review of previous records: reviewed- if available                 [1]   Patient Active Problem List  Diagnosis    Thumb pain    Internal derangement of knee    Seborrheic keratoses    Cherry angioma    Telangiectasia    VT (ventricular tachycardia) (HCC)    Acute pain of left knee    LEFT RSA Global 05/28/2019    Diabetes  mellitus, type 2 (HCC)    Diabetic neuropathy associated with type 2 diabetes mellitus (HCC)    Prostate CA (HCC)    Histoplasmosis    Urinary leakage    Left shoulder pain, unspecified chronicity    Primary osteoarthritis of right knee    Complete tear of left rotator cuff    Complex tear of medial meniscus of right knee as current injury, initial encounter    Right knee arthritis and torn medial  Global 01/16/2018    Status post right knee replacement    Muscle cramping    Right rotator cuff tear arthropathy    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   [2]   Past Medical History:   Back problem    Coronary atherosclerosis    Diabetes (HCC)    Diabetes type 2, controlled (HCC)    Esophageal reflux    Hearing impairment    hearing aid    Heart attack (HCC)    silent    High blood pressure    High cholesterol    Histoplasmosis    Neuropathy    Osteoarthritis    Other and unspecified hyperlipidemia    Precordial pain    Prostate CA (HCC)    Hx of seed implants    Unspecified sleep apnea    CPAP    Urinary leakage    Visual impairment    glasses    VT (ventricular tachycardia) (HCC)   [3]   Past Surgical History:  Procedure Laterality Date    Hernia surgery Bilateral     inguinal    Other surgical history  1991, 2003    B/L shoulder RCR    Other surgical history      Rt lung surgery lesions exc    Other surgical history      angiograms    Other surgical history      bx and prostate seed implants    Other surgical history Right     dislocation shoulder with reduction    Total knee replacement Right     Total shoulder arthroplasty Left     x2   [4]   Family History  Problem Relation Age of Onset    Heart Disorder Father     Cancer Mother     Heart Disorder Brother    [5]   Social History  Socioeconomic History     Marital status:    Tobacco Use    Smoking status: Former     Current packs/day: 0.00     Types: Cigarettes     Start date: 1965     Quit date: 1981     Years since quittin.4     Passive exposure: Past    Smokeless tobacco: Never   Vaping Use    Vaping status: Never Used   Substance and Sexual Activity    Alcohol use: No    Drug use: No     Social Drivers of Health     Food Insecurity: No Food Insecurity (2024)    Food Insecurity     Food Insecurity: Never true   Transportation Needs: No Transportation Needs (2024)    Transportation Needs     Lack of Transportation: No   Housing Stability: Low Risk  (2024)    Housing Stability     Housing Instability: No   [6] No current facility-administered medications for this encounter.  [7]   Allergies  Allergen Reactions    Amoxicillin RASH    Amoxicillin-Pot Clavulanate RASH    Cholera Toxin B Subunit Recombinant RASH   [8]   Patient Active Problem List  Diagnosis    Thumb pain    Internal derangement of knee    Seborrheic keratoses    Cherry angioma    Telangiectasia    VT (ventricular tachycardia) (HCC)    Acute pain of left knee    LEFT RSA Global 2019    Diabetes mellitus, type 2 (HCC)    Diabetic neuropathy associated with type 2 diabetes mellitus (HCC)    Prostate CA (HCC)    Histoplasmosis    Urinary leakage    Left shoulder pain, unspecified chronicity    Primary osteoarthritis of right knee    Complete tear of left rotator cuff    Complex tear of medial meniscus of right knee as current injury, initial encounter    Right knee arthritis and torn medial  Global 2018    Status post right knee replacement    Muscle cramping    Right rotator cuff tear arthropathy    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

## 2025-06-20 ENCOUNTER — HOSPITAL ENCOUNTER (OUTPATIENT)
Dept: CT IMAGING | Age: 80
Discharge: HOME | End: 2025-06-20
Attending: UROLOGY
Payer: MEDICARE

## 2025-06-20 DIAGNOSIS — R31.29 MICROSCOPIC HEMATURIA: ICD-10-CM

## 2025-06-20 PROCEDURE — 74178 CT ABD&PLV WO CNTR FLWD CNTR: CPT | Performed by: UROLOGY

## 2025-06-23 RX ORDER — CEFADROXIL 500 MG/1
500 CAPSULE ORAL 2 TIMES DAILY
COMMUNITY
Start: 2025-06-25 | End: 2025-06-26

## 2025-06-23 RX ORDER — GABAPENTIN 300 MG/1
300 CAPSULE ORAL NIGHTLY
COMMUNITY

## 2025-06-25 NOTE — DISCHARGE INSTRUCTIONS
Finish the antibiotics you have at home.   Take an additional 3 days which we have provided a prescription.   You can take Tylenol as needed.   You can take Azo as needed.     After the procedure  If you had any type of sedation, general anesthesia, or spinal anesthesia, you must have someone drive you home. Once you’re home:   Drink plenty of fluids.  You may have a burning feeling or light bleeding when you pee. This is normal.  Medicines may be prescribed to ease any mild pain or prevent infection. Take these as directed.  When to call your healthcare provider  Call your healthcare provider if you have any of these after the procedure:   Heavy bleeding or blood clots  Burning that lasts more than 1 day  Fever of  100.4° F ( 38°C ) or higher, or as directed by your provider  Trouble peeing     HOME INSTRUCTIONS  [facility] HOME CARE INSTRUCTIONS: POST-OP ANESTHESIA  The medication that you received for sedation or general anesthesia can last up to 24 hours. Your judgment and reflexes may be altered, even if you feel like your normal self.      We Recommend:   Do not drive any motor vehicle or bicycle   Avoid mowing the lawn, playing sports, or working with power tools/applicances (power saws, electric knives or mixers)   That you have someone stay with you on your first night home   Do not drink alcohol or take sleeping pills or tranquilizers   Do not sign legal documents within 24 hours of your procedure   If you had a nerve block for your surgery, take extra care not to put any pressure on your arm or hand for 24 hours    It is normal:  For you to have a sore throat if you had a breathing tube during surgery (while you were asleep!). The sore throat should get better within 48 hours. You can gargle with warm salt water (1/2 tsp in 4 oz warm water) or use a throat lozenge for comfort  To feel muscle aches or soreness especially in the abdomen, chest or neck. The achy feeling should go away in the next 24 hours  To  feel weak, sleepy or \"wiped out\". Your should start feeling better in the next 24 hours.   To experience mild discomforts such as sore lip or tongue, headache, cramps, gas pains or a bloated feeling in your abdomen.   To experience mild back pain or soreness for a day or two if you had spinal or epidural anesthesia.   If you had laparoscopic surgery, to feel shoulder pain or discomfort on the day of surgery.   For some patients to have nausea after surgery/anesthesia    If you feel nausea or experience vomiting:   Try to move around less.   Eat less than usual or drink only liquids until the next morning   Nausea should resolve in about 24 hours    If you have a problem when you are at home:    Call your surgeons office   Discharge Instructions: After Your Surgery  You’ve just had surgery. During surgery, you were given medicine called anesthesia to keep you relaxed and free of pain. After surgery, you may have some pain or nausea. This is common. Here are some tips for feeling better and getting well after surgery.   Going home  Your healthcare provider will show you how to take care of yourself when you go home. They'll also answer your questions. Have an adult family member or friend drive you home. For the first 24 hours after your surgery:   Don't drive or use heavy equipment.  Don't make important decisions or sign legal papers.  Take medicines as directed.  Don't drink alcohol.  Have someone stay with you, if needed. They can watch for problems and help keep you safe.  Be sure to go to all follow-up visits with your healthcare provider. And rest after your surgery for as long as your provider tells you to.   Coping with pain  If you have pain after surgery, pain medicine will help you feel better. Take it as directed, before pain becomes severe. Also, ask your healthcare provider or pharmacist about other ways to control pain. This might be with heat, ice, or relaxation. And follow any other instructions your  surgeon or nurse gives you.      Stay on schedule with your medicine.     Tips for taking pain medicine  To get the best relief possible, remember these points:   Pain medicines can upset your stomach. Taking them with a little food may help.  Most pain relievers taken by mouth need at least 20 to 30 minutes to start to work.  Don't wait till your pain becomes severe before you take your medicine. Try to time your medicine so that you can take it before starting an activity. This might be before you get dressed, go for a walk, or sit down for dinner.  Constipation is a common side effect of some pain medicines. Call your healthcare provider before taking any medicines, such as laxatives or stool softeners, to help ease constipation. Also ask if you should skip any foods. Drinking lots of fluids and eating foods, such as fruits and vegetables, that are high in fiber can also help. Remember, don't take laxatives unless your surgeon has prescribed them.  Drinking alcohol and taking pain medicine can cause dizziness and slow your breathing. It can even be deadly. Don't drink alcohol while taking pain medicine.  Pain medicine can make you react more slowly to things. Don't drive or run machinery while taking pain medicine.  Your healthcare provider may tell you to take acetaminophen to help ease your pain. Ask them how much you're supposed to take each day. Acetaminophen or other pain relievers may interact with your prescription medicines or other over-the-counter (OTC) medicines. Some prescription medicines have acetaminophen and other ingredients in them. Using both prescription and OTC acetaminophen for pain can cause you to accidentally overdose. Read the labels on your OTC medicines with care. This will help you to clearly know the list of ingredients, how much to take, and any warnings. It may also help you not take too much acetaminophen. If you have questions or don't understand the information, ask your  pharmacist or healthcare provider to explain it to you before you take the OTC medicine.   Managing nausea  Some people have an upset stomach (nausea) after surgery. This is often because of anesthesia, pain, or pain medicine, less movement of food in the stomach, or the stress of surgery. These tips will help you handle nausea and eat healthy foods as you get better. If you were on a special food plan before surgery, ask your healthcare provider if you should follow it while you get better. Check with your provider on how your eating should progress. It may depend on the surgery you had. These general tips may help:   Don't push yourself to eat. Your body will tell you when to eat and how much.  Start off with clear liquids and soup. They're easier to digest.  Next try semi-solid foods as you feel ready. These include mashed potatoes, applesauce, and gelatin.  Slowly move to solid foods. Don’t eat fatty, rich, or spicy foods at first.  Don't force yourself to have 3 large meals a day. Instead eat smaller amounts more often.  Take pain medicines with a small amount of solid food, such as crackers or toast. This helps prevent nausea.  When to call your healthcare provider  Call your healthcare provider right away if you have any of these:   You still have too much pain, or the pain gets worse, after taking the medicine. The medicine may not be strong enough. Or there may be a complication from the surgery.  You feel too sleepy, dizzy, or groggy. The medicine may be too strong.  Side effects, such as nausea or vomiting. Your healthcare provider may advise taking other medicines to treat these or may change your treatment plan..  Skin changes, such as rash, itching, or hives. This may mean you have an allergic reaction. Your provider may advise taking other medicines.  The incision looks different (for instance, part of it opens up).  Bleeding or fluid leaking from the incision site, and you weren't told to expect  that.  Fever of 100.4°F (38°C) or higher, or as directed by your healthcare provider.  Call 911  Call 911 right away if you have:   Trouble breathing  Facial swelling    If you have obstructive sleep apnea   You were given anesthesia medicine during surgery to keep you comfortable and free of pain. After surgery, you may have more apnea spells because of this medicine and other medicines you were given. The spells may last longer than normal.    At home:  Keep using the continuous positive airway pressure (CPAP) device when you sleep. Unless your healthcare provider tells you not to, use it when you sleep, day or night. CPAP is a common device used to treat obstructive sleep apnea.  Talk with your provider before taking any pain medicine, muscle relaxants, or sedatives. Your provider will tell you about the possible dangers of taking these medicines.  Contact your provider if your sleeping changes a lot even when taking medicines as directed.  [employer] last reviewed this educational content on 4/1/2024  This information is for informational purposes only. This is not intended to be a substitute for professional medical advice, diagnosis, or treatment. Always seek the advice and follow the directions from your physician or other qualified health care provider.  © 4693-5603 [employer]. All rights reserved. This information is not intended as a substitute for professional medical care. Always follow your healthcare professional's instructions.

## 2025-06-26 ENCOUNTER — APPOINTMENT (OUTPATIENT)
Dept: GENERAL RADIOLOGY | Facility: HOSPITAL | Age: 80
End: 2025-06-26
Attending: UROLOGY
Payer: MEDICARE

## 2025-06-26 ENCOUNTER — HOSPITAL ENCOUNTER (OUTPATIENT)
Facility: HOSPITAL | Age: 80
Setting detail: HOSPITAL OUTPATIENT SURGERY
Discharge: HOME | End: 2025-06-26
Attending: UROLOGY | Admitting: UROLOGY
Payer: MEDICARE

## 2025-06-26 ENCOUNTER — ANESTHESIA EVENT (OUTPATIENT)
Dept: SURGERY | Facility: HOSPITAL | Age: 80
End: 2025-06-26
Payer: MEDICARE

## 2025-06-26 ENCOUNTER — ANESTHESIA (OUTPATIENT)
Dept: SURGERY | Facility: HOSPITAL | Age: 80
End: 2025-06-26
Payer: MEDICARE

## 2025-06-26 VITALS
HEART RATE: 59 BPM | HEIGHT: 66 IN | SYSTOLIC BLOOD PRESSURE: 128 MMHG | WEIGHT: 170 LBS | RESPIRATION RATE: 18 BRPM | OXYGEN SATURATION: 94 % | DIASTOLIC BLOOD PRESSURE: 68 MMHG | TEMPERATURE: 98 F | BODY MASS INDEX: 27.32 KG/M2

## 2025-06-26 LAB
GLUCOSE BLDC GLUCOMTR-MCNC: 107 MG/DL (ref 70–99)
GLUCOSE BLDC GLUCOMTR-MCNC: 128 MG/DL (ref 70–99)

## 2025-06-26 PROCEDURE — 82962 GLUCOSE BLOOD TEST: CPT

## 2025-06-26 PROCEDURE — 87086 URINE CULTURE/COLONY COUNT: CPT | Performed by: UROLOGY

## 2025-06-26 PROCEDURE — 88305 TISSUE EXAM BY PATHOLOGIST: CPT | Performed by: UROLOGY

## 2025-06-26 RX ORDER — CEFADROXIL 500 MG/1
500 CAPSULE ORAL 2 TIMES DAILY
Qty: 10 CAPSULE | Refills: 1 | Status: SHIPPED | OUTPATIENT
Start: 2025-06-26 | End: 2025-07-01

## 2025-06-26 RX ORDER — ACETAMINOPHEN 325 MG/1
650 TABLET ORAL
Status: CANCELLED | OUTPATIENT
Start: 2025-06-26

## 2025-06-26 RX ORDER — SODIUM CHLORIDE, SODIUM LACTATE, POTASSIUM CHLORIDE, CALCIUM CHLORIDE 600; 310; 30; 20 MG/100ML; MG/100ML; MG/100ML; MG/100ML
INJECTION, SOLUTION INTRAVENOUS CONTINUOUS
Status: DISCONTINUED | OUTPATIENT
Start: 2025-06-26 | End: 2025-06-26

## 2025-06-26 RX ORDER — PHENAZOPYRIDINE HYDROCHLORIDE 95 MG/1
95 TABLET ORAL 3 TIMES DAILY PRN
Qty: 21 TABLET | Refills: 3 | Status: SHIPPED | OUTPATIENT
Start: 2025-06-26

## 2025-06-26 RX ORDER — HYDROMORPHONE HYDROCHLORIDE 1 MG/ML
0.4 INJECTION, SOLUTION INTRAMUSCULAR; INTRAVENOUS; SUBCUTANEOUS EVERY 2 HOUR PRN
Refills: 0 | Status: CANCELLED | OUTPATIENT
Start: 2025-06-26

## 2025-06-26 RX ORDER — GLYCOPYRROLATE 0.2 MG/ML
INJECTION, SOLUTION INTRAMUSCULAR; INTRAVENOUS AS NEEDED
Status: DISCONTINUED | OUTPATIENT
Start: 2025-06-26 | End: 2025-06-26 | Stop reason: SURG

## 2025-06-26 RX ORDER — HYDROMORPHONE HYDROCHLORIDE 1 MG/ML
0.4 INJECTION, SOLUTION INTRAMUSCULAR; INTRAVENOUS; SUBCUTANEOUS EVERY 5 MIN PRN
Status: DISCONTINUED | OUTPATIENT
Start: 2025-06-26 | End: 2025-06-26

## 2025-06-26 RX ORDER — MORPHINE SULFATE 4 MG/ML
2 INJECTION, SOLUTION INTRAMUSCULAR; INTRAVENOUS EVERY 10 MIN PRN
Status: DISCONTINUED | OUTPATIENT
Start: 2025-06-26 | End: 2025-06-26

## 2025-06-26 RX ORDER — NICOTINE POLACRILEX 4 MG
15 LOZENGE BUCCAL
Status: DISCONTINUED | OUTPATIENT
Start: 2025-06-26 | End: 2025-06-26

## 2025-06-26 RX ORDER — LIDOCAINE HYDROCHLORIDE 20 MG/ML
INJECTION, SOLUTION EPIDURAL; INFILTRATION; INTRACAUDAL; PERINEURAL AS NEEDED
Status: DISCONTINUED | OUTPATIENT
Start: 2025-06-26 | End: 2025-06-26 | Stop reason: SURG

## 2025-06-26 RX ORDER — NICOTINE POLACRILEX 4 MG
30 LOZENGE BUCCAL
Status: DISCONTINUED | OUTPATIENT
Start: 2025-06-26 | End: 2025-06-26

## 2025-06-26 RX ORDER — DEXTROSE MONOHYDRATE 25 G/50ML
50 INJECTION, SOLUTION INTRAVENOUS
Status: DISCONTINUED | OUTPATIENT
Start: 2025-06-26 | End: 2025-06-26

## 2025-06-26 RX ORDER — OXYCODONE HYDROCHLORIDE 5 MG/1
5 TABLET ORAL EVERY 4 HOURS PRN
Refills: 0 | Status: CANCELLED | OUTPATIENT
Start: 2025-06-26

## 2025-06-26 RX ORDER — HYDROMORPHONE HYDROCHLORIDE 1 MG/ML
0.6 INJECTION, SOLUTION INTRAMUSCULAR; INTRAVENOUS; SUBCUTANEOUS EVERY 5 MIN PRN
Status: DISCONTINUED | OUTPATIENT
Start: 2025-06-26 | End: 2025-06-26

## 2025-06-26 RX ORDER — OXYCODONE HYDROCHLORIDE 5 MG/1
2.5 TABLET ORAL EVERY 4 HOURS PRN
Refills: 0 | Status: CANCELLED | OUTPATIENT
Start: 2025-06-26

## 2025-06-26 RX ORDER — MORPHINE SULFATE 10 MG/ML
6 INJECTION, SOLUTION INTRAMUSCULAR; INTRAVENOUS EVERY 10 MIN PRN
Status: DISCONTINUED | OUTPATIENT
Start: 2025-06-26 | End: 2025-06-26

## 2025-06-26 RX ORDER — NALOXONE HYDROCHLORIDE 0.4 MG/ML
80 INJECTION, SOLUTION INTRAMUSCULAR; INTRAVENOUS; SUBCUTANEOUS AS NEEDED
Status: DISCONTINUED | OUTPATIENT
Start: 2025-06-26 | End: 2025-06-26

## 2025-06-26 RX ORDER — MORPHINE SULFATE 4 MG/ML
4 INJECTION, SOLUTION INTRAMUSCULAR; INTRAVENOUS EVERY 10 MIN PRN
Status: DISCONTINUED | OUTPATIENT
Start: 2025-06-26 | End: 2025-06-26

## 2025-06-26 RX ORDER — PHENAZOPYRIDINE HYDROCHLORIDE 100 MG/1
100 TABLET, FILM COATED ORAL ONCE
Status: COMPLETED | OUTPATIENT
Start: 2025-06-26 | End: 2025-06-26

## 2025-06-26 RX ORDER — METOPROLOL TARTRATE 25 MG/1
25 TABLET, FILM COATED ORAL ONCE AS NEEDED
Status: DISCONTINUED | OUTPATIENT
Start: 2025-06-26 | End: 2025-06-26 | Stop reason: HOSPADM

## 2025-06-26 RX ORDER — HYDROMORPHONE HYDROCHLORIDE 1 MG/ML
0.2 INJECTION, SOLUTION INTRAMUSCULAR; INTRAVENOUS; SUBCUTANEOUS EVERY 2 HOUR PRN
Refills: 0 | Status: CANCELLED | OUTPATIENT
Start: 2025-06-26

## 2025-06-26 RX ORDER — ACETAMINOPHEN 500 MG
1000 TABLET ORAL ONCE
Status: COMPLETED | OUTPATIENT
Start: 2025-06-26 | End: 2025-06-26

## 2025-06-26 RX ORDER — PHENAZOPYRIDINE HYDROCHLORIDE 200 MG/1
200 TABLET, FILM COATED ORAL ONCE AS NEEDED
Status: CANCELLED | OUTPATIENT
Start: 2025-06-26 | End: 2025-06-26

## 2025-06-26 RX ORDER — EPHEDRINE SULFATE 50 MG/ML
INJECTION INTRAVENOUS AS NEEDED
Status: DISCONTINUED | OUTPATIENT
Start: 2025-06-26 | End: 2025-06-26 | Stop reason: SURG

## 2025-06-26 RX ORDER — GENTAMICIN 40 MG/ML
INJECTION, SOLUTION INTRAMUSCULAR; INTRAVENOUS AS NEEDED
Status: DISCONTINUED | OUTPATIENT
Start: 2025-06-26 | End: 2025-06-26 | Stop reason: HOSPADM

## 2025-06-26 RX ORDER — METOPROLOL TARTRATE 1 MG/ML
2.5 INJECTION, SOLUTION INTRAVENOUS ONCE
Status: DISCONTINUED | OUTPATIENT
Start: 2025-06-26 | End: 2025-06-26

## 2025-06-26 RX ORDER — HYDROMORPHONE HYDROCHLORIDE 1 MG/ML
0.2 INJECTION, SOLUTION INTRAMUSCULAR; INTRAVENOUS; SUBCUTANEOUS EVERY 5 MIN PRN
Status: DISCONTINUED | OUTPATIENT
Start: 2025-06-26 | End: 2025-06-26

## 2025-06-26 RX ORDER — IOPAMIDOL 612 MG/ML
INJECTION, SOLUTION INTRATHECAL AS NEEDED
Status: DISCONTINUED | OUTPATIENT
Start: 2025-06-26 | End: 2025-06-26 | Stop reason: HOSPADM

## 2025-06-26 RX ADMIN — EPHEDRINE SULFATE 2.5 MG: 50 INJECTION INTRAVENOUS at 14:05:00

## 2025-06-26 RX ADMIN — GLYCOPYRROLATE 0.1 MG: 0.2 INJECTION, SOLUTION INTRAMUSCULAR; INTRAVENOUS at 14:05:00

## 2025-06-26 RX ADMIN — LIDOCAINE HYDROCHLORIDE 80 MG: 20 INJECTION, SOLUTION EPIDURAL; INFILTRATION; INTRACAUDAL; PERINEURAL at 14:05:00

## 2025-06-26 RX ADMIN — SODIUM CHLORIDE, SODIUM LACTATE, POTASSIUM CHLORIDE, CALCIUM CHLORIDE: 600; 310; 30; 20 INJECTION, SOLUTION INTRAVENOUS at 14:00:00

## 2025-06-26 NOTE — ANESTHESIA POSTPROCEDURE EVALUATION
Patient: Ray Bustillo    Procedure Summary       Date: 06/26/25 Room / Location: [facility] MAIN OR 02 / EM MAIN OR    Anesthesia Start: 1359 Anesthesia Stop:     Procedure: Cystoscopy, bilateral retrograde pyelogram, flouroscopy, removal of prostatic lesion, fulgaration of prostatic fossa, dilatation of urethra and bladder neck (Penis) Diagnosis: (Micro hematuria, prostate cancer)    Surgeons: Bryn Wilson MD Anesthesiologist: Baylee Upton DO    Anesthesia Type: general ASA Status: 3            Anesthesia Type: general    Vitals Value Taken Time   /66 06/26/25 14:58   Temp 97.9 06/26/25 15:00   Pulse 62 06/26/25 15:00   Resp 16 06/26/25 15:00   SpO2 96 % 06/26/25 15:00   Vitals shown include unfiled device data.    [facility] AN Post Evaluation:   Patient Evaluated in PACU  Patient Participation: complete - patient participated  Level of Consciousness: awake and alert  Pain Score: 0  Pain Management: adequate  Airway Patency:patent  Yes    Nausea/Vomiting: none  Cardiovascular Status: acceptable  Respiratory Status: acceptable, nasal cannula and spontaneous ventilation  Postoperative Hydration acceptable      Vannessa Jay CRNA  6/26/2025 3:00 PM

## 2025-06-26 NOTE — ANESTHESIA PROCEDURE NOTES
Airway  Date/Time: 6/26/2025 2:09 PM  Reason: Elective      General Information and Staff   Patient location during procedure: OR  Anesthesiologist: Baylee Upton DO  Resident/CRNA: Vannessa Jay CRNA  Performed: CRNA   Performed by: Vannessa Jay CRNA  Authorized by: Baylee Upton DO        Indications and Patient Condition  Indications for airway management: anesthesia  Sedation level: deep      Preoxygenated: yesPatient position: sniffing    Mask difficulty assessment: 2 - vent by mask + OA or adjuvant +/- NMBA    Final Airway Details    Final airway type: supraglottic airway      Successful airway: classic  Size: 4     Number of attempts at approach: 1    Additional Comments  Easy, atraumatic placement of LMA

## 2025-06-26 NOTE — INTERVAL H&P NOTE
Pre-op Diagnosis: Micro hematuria, prostate cancer    The above referenced H&P was reviewed by MAURICIO KERN MD on 6/26/2025, the patient was examined and no significant changes have occurred in the patient's condition since the H&P was performed.  I discussed with the patient and/or legal representative the potential benefits, risks and side effects of this procedure; the likelihood of the patient achieving goals; and potential problems that might occur during recuperation.  I discussed reasonable alternatives to the procedure, including risks, benefits and side effects related to the alternatives and risks related to not receiving this procedure.  We will proceed with procedure as planned.

## 2025-06-26 NOTE — BRIEF OP NOTE
[facility]  part of [facility]    POST ANESTHESIA CARE UNIT  Brief Op Note       Patients Name: Ray Bustillo  Attending Physician: Bryn Kern MD  Operating Physician: BRYN KERN MD  CSN: [id number]     Location:  OR  MRN: [id number]     YOB: 1945  Admission Date: 6/26/2025  Operation Date: 6/26/2025    Pre-Operative Diagnosis: Microscopic hematuria, recent history of gross hematuria, recent urinary tract infection, mild obstructive uropathy.  Prostate cancer    Post-Operative Diagnosis: Same with tight bulbar urethra, tight bladder neck, prostatic fossa oozing, prostatic fossa lesion.    Procedure Performed: Cystoscopy, bilateral retrograde pyelogram, flouroscopy, removal of prostatic lesion, fulgaration of prostatic fossa, dilatation of urethra and bladder neck, and rectal exam    Primary Surgeon:  BRYN KERN MD        Anesthesia: General  CRNA: Vannessa Jay CRNA  Anesthesiologist EM: Baylee Upton DO    Findings: Urethra was normal until the bulbar urethra which was tight but dilated with the scope.  Prostatic fossa was open but there was a 1 cm lesion of tissue at approximately the 7 o'clock position in the right proximal prostatic fossa.  The bladder neck was tight but dilated with the scope.  The bladder showed mild diffuse erythema from possible recent cystitis.  Bilateral retrogrades were normal.  On delayed injection on the left there was some mild extravasation.  Postvoid residual was only 30 cc.  Rectal exam revealed a 3 g prostate smooth no nodules and symmetric.  There were no rectal masses and the stool was brown.    EBL: 1 mL    Complications: None    Specimens:    ID Type Source Tests Collected by Time Destination   1 : 1. Biopsy from prostatic fossa Tissue Prostate SURGICAL PATHOLOGY TISSUE Bryn Kern MD 6/26/2025 1438    A : A. Urine for culture Body fluid, unspecified Urine, cystoscopic URINE CULTURE, ROUTINE  Bryn Kern MD 6/26/2025 1430        Condition:  stable    BRYN KERN MD  6/26/2025  3:09 PM

## 2025-06-27 NOTE — OPERATIVE REPORT
[facility]    PATIENT'S NAME: DEMETRA HODGES   ATTENDING PHYSICIAN: Bryn Wilson MD   OPERATING PHYSICIAN: Bryn Wilson MD   PATIENT ACCOUNT#:   [id number]    LOCATION:  [address]  MEDICAL RECORD #:   [id number]       YOB: 1945  ADMISSION DATE:       06/26/2025      OPERATION DATE:  06/26/2025    OPERATIVE REPORT    PREOPERATIVE DIAGNOSIS:  History of originally microscopic hematuria but then 2 days of gross hematuria, recent urinary tract infection with possible bilateral pyelonephritis, mild obstructive uropathy and a history of prostate cancer with no evidence of recent recurrence.   POSTOPERATIVE DIAGNOSIS:  History of originally microscopic hematuria but then 2 days of gross hematuria, recent urinary tract infection with possible bilateral pyelonephritis, mild obstructive uropathy and a history of prostate cancer with no evidence of recent recurrence, with a tight bulbar urethra and a tight bladder neck.  Prostatic fossa had some oozing, and there was a prostatic fossa lesion.   PROCEDURE:  Cystoscopy, bilateral retrograde pyelogram, fluoroscopy, removal of prostatic lesion, fulguration of prostatic fossa, dilatation of urethra and bladder neck, a rectal exam, and placement of an 18-Mongolian Mac.      ANESTHESIA:  General.    ESTIMATED BLOOD LOSS:  1 mL.    COMPLICATIONS:  None.    INDICATIONS:  This patient is an 80-year-old male who I saw in my office several weeks ago.  He had microscopic hematuria.  Because of this, we recommended a CT urogram and a cystoscopy.  Approximately 1 week later, he developed gross hematuria and a fever.  He was given antibiotics by his physician who was treating him for a urinary tract infection.  Although the urine culture was negative, he may have started the antibiotics before the culture was done, but we do not know this for sure.  His hematuria resolved in 2 days.  His fever resolved several days later, and he now feels  much better.  He states he is voiding with a relatively good stream.  It is okay.  He says he usually feels empty.  He had not urinated since this morning when he got up.  His CT urogram showed multiple incidental findings but did mention diffuse bladder wall thickening concerning for a cystitis, a nonspecific heterogeneous enhancement of the kidneys with bilateral collecting system prominence.  Please note that pyelonephritis is largely a clinical diagnosis is what they said.  There was uncomplicated distal colonic diverticulosis, back surgery, and the brachytherapy seeds were present.  The patient now comes for his completion of his urologic evaluation with a cystoscopy.  He states he is somewhat satisfied with his urination, so I told him I would not do a TURP at this time.  I have explained to him and his family the procedures, risks, and complications.  They understand, accept, and desire me to proceed.     FINDINGS:  Urethra was normal until the bulbar urethra which was tight but dilated easily with the scope.  Prostatic fossa was open, but there was a 1 cm lesion of tissue at approximately 7 o'clock position in the right proximal prostatic fossa.  This was removed and resected in its entirety and biopsied.  The bladder neck was tight but dilated with the scope.  There was some mild oozing in the prostatic fossa.  The bladder showed mild diffuse erythema from possible recent cystitis.  Bilateral retrograde pyelograms were normal and a delayed injection to get a better elucidation of the left lower pole collecting system.  There was some very mild extravasation.  Postvoid residual was only 30 mL.  Rectal exam revealed a 3 g prostate, smooth, no nodules, and symmetric.  There were no rectal masses, and the stool was brown.  Urine will be sent for culture, and a Mac was inserted.    OPERATIVE TECHNIQUE:  Patient brought to the operating theater.  General anesthesia was administered.  He took a Duricef 2 hours  prior to the procedure.  He was carefully prepped and draped in usual sterile fashion in semi-lithotomy position.  We then used a C-arm, and we did image intensifier to do  x-rays.  These revealed the seeds nicely grouped within the prostate, and there was no definite calcifications overlying the ureters or kidneys.  He had hardware in his low back from previous back surgery.  Next, we inserted the 22-Kinyarwanda panendoscope with 30-degree lens and videoscope.  The urethra was normal until the bulbar urethra, which was slightly tight, but we dilated this easily with the scope.  Prostatic fossa was visualized.  There was a polyp-type square tissue at about the 7 o'clock position on the right side of the proximal prostatic fossa.  The prostate was nonobstructing.  There was some mild oozing in the prostatic fossa with some mild irritation, but no stones or seeds were visualized.  The bladder was then entered and emptied.  There was a very minimal residual urine of only about 30 mL, and that urine was clear.  The bladder was carefully surveilled with the 30-degree and 70-degrees lenses.  Both ureteral orifices were identified effluxing clear yellow urine.  There was no evidence of tumor, bleeding, blood clot, or stones within the bladder.  Next, we used a cone-tip catheter with contrast mixed with gentamicin.  We first cannulated the left ureteral orifice and then the right.  We instilled contrast gently.  This opacified the system well.  We had to do some extra injections on the left to fill out the left lower pole to my satisfaction.  This, however, resulted in some very mild extravasation, which seemed to dissipate quickly.  The bilateral retrograde pyelograms were normal, and the kidneys and ureters drained well.  Next, we turned out attention to the prostatic fossa.  We used the rigid biopsies and resected this polypoid-type tissue lesion from the prostatic fossa.  We sent this to Pathology.  I then also used the  flexible scopes to clean it gently around to not be close to the urethra.  We then used the Bugbee cautery.  We cauterized the prostatic fossa bleeding and the base of where the lesion was removed in the proximal prostatic fossa at the 7 o'clock position.  We now had excellent hemostasis.  There was no more bleeding from the prostatic fossa.  The lesion of the prostatic fossa was removed.  The rest of the bladder looked okay.  The bladder was emptied, and the scope was removed.  We used fluoroscopy throughout the procedure and interpreted as we went to not overfill or underfill the system.  We then sterilely inserted an 18-Bruneian Mac, put 10 mL in the balloon, and this irrigated clear urine and was in good position.  We attached 2 [employer], 1 on each leg, and attached the Mac drainage bag after hooking it to the Mac to the left leg.  We put 4 x 4's around the penis.  We did a rectal exam, which revealed a 3 g, smooth flat prostate with no nodules and was symmetric.  There were no rectal masses, and the stool was brown.  At this point, the patient tolerated the procedure well and was transferred to the recovery room in stable condition.    FINAL ASSESSMENT:  Hematuria is possibly from the prostatic fossa.  Maybe he had a cystitis in addition.  He had a prostatic lesion which could have been causing some obstructive uropathy and predisposed him to a UTI.  He also had some narrowing at the bladder neck and the prostatic urethra, probably from previous radiation, and a slight area of narrowing at the bulbar urethra, but this was dilated easily with the scope.  Because we removed the lesion and he had some oozing, I wanted to just make him more comfortable with a Mac, which we left in.  My plan will be to continue to follow his PSA, continue to follow him for his urinary tract infections.  He will have a standing order for a urine culture and sensitivity and Macrobid to take if it is positive.  He will finish a  short course of antibiotics now for 5 days with Duricef.  He will come to the office to have the Mac removed as scheduled, and we will follow up on his biopsy results.  We will encourage him to do timed voiding.  Because of his diabetes, he may have a neurogenic bladder.    Dictated By Bryn Wilson MD  d: 06/26/2025 15:20:18  t: 06/26/2025 18:26:43  Job [id number]  GJK/    cc: Marck Frias MD

## 2025-08-25 ENCOUNTER — HOSPITAL ENCOUNTER (OUTPATIENT)
Dept: ULTRASOUND IMAGING | Facility: HOSPITAL | Age: 80
Discharge: HOME | End: 2025-08-25
Attending: UROLOGY

## 2025-08-25 ENCOUNTER — LAB ENCOUNTER (OUTPATIENT)
Dept: LAB | Facility: HOSPITAL | Age: 80
End: 2025-08-25
Attending: UROLOGY

## 2025-08-25 DIAGNOSIS — N13.9 OBSTRUCTED, UROPATHY: ICD-10-CM

## 2025-08-25 DIAGNOSIS — N31.0 UNINHIBITED NEUROPATHIC BLADDER, NOT ELSEWHERE CLASSIFIED: ICD-10-CM

## 2025-08-25 DIAGNOSIS — N31.9 NEUROGENIC BLADDER: ICD-10-CM

## 2025-08-25 LAB
CREAT BLD-MCNC: 1.2 MG/DL (ref 0.7–1.3)
EGFRCR SERPLBLD CKD-EPI 2021: 61 ML/MIN/1.73M2 (ref 60–?)

## 2025-08-25 PROCEDURE — 36415 COLL VENOUS BLD VENIPUNCTURE: CPT

## 2025-08-25 PROCEDURE — 76770 US EXAM ABDO BACK WALL COMP: CPT | Performed by: UROLOGY

## 2025-08-25 PROCEDURE — 82565 ASSAY OF CREATININE: CPT

## (undated) DEVICE — HANDPIECE SET WITH HIGH FLOW TIP AND SUCTION TUBE: Brand: INTERPULSE

## (undated) DEVICE — MONITORING NEUROPHYSIOLOGICAL

## (undated) DEVICE — ELECTRODE ESURG 2.75IN EZ CLN

## (undated) DEVICE — CASSETTE DRAPE: Brand: UNBRANDED

## (undated) DEVICE — GOWN SURG AERO BLUE PERF LG

## (undated) DEVICE — PACK CDS LAMINECTOMY

## (undated) DEVICE — 3M™ IOBAN™ 2 ANTIMICROBIAL INCISE DRAPE 6650EZ: Brand: IOBAN™ 2

## (undated) DEVICE — 3M™ TEGADERM™ TRANSPARENT FILM DRESSING FRAME STYLE, 1626W, 4 IN X 4-3/4 IN (10 CM X 12 CM), 50/CT 4CT/CASE: Brand: 3M™ TEGADERM™

## (undated) DEVICE — KIT EVAC 400CC DIA1/8IN H PAT 12.5IN 3 SPR

## (undated) DEVICE — CONTAINER,SPECIMEN,OR STERILE,4OZ: Brand: MEDLINE

## (undated) DEVICE — SUT MONOCRYL 2-0 SH Y417H

## (undated) DEVICE — NEEDLE NRV STIM BVL TIP INSUL PEDCL ACCS SYS

## (undated) DEVICE — STERILE H2O FOR IRRIG 3000 ML BAG

## (undated) DEVICE — 3.0MM PRECISION NEURO (MATCH HEAD)

## (undated) DEVICE — PAD,NON-ADHERENT,3X4,STERILE,LF,1/PK: Brand: CURAD

## (undated) DEVICE — TRAY INSTR PWR NUVASIVE

## (undated) DEVICE — GLOVE SUR 6.5 SENSICARE PI MIC PIP CRM PWD F

## (undated) DEVICE — SHOULDER: Brand: MEDLINE INDUSTRIES, INC.

## (undated) DEVICE — SPONGE LAP 18X18IN WHT COT 4 PLY FLD STRUNG

## (undated) DEVICE — SPONGE GZ 4X4IN COT 12 PLY TYP VII WVN C

## (undated) DEVICE — GLOVE SUR 7.5 SENSICARE PI MIC PIP CRM PWD F

## (undated) DEVICE — SOLUTION IRRIG 1000ML 0.9% NACL USP BTL

## (undated) DEVICE — SHEET,DRAPE,53X77,STERILE: Brand: MEDLINE

## (undated) DEVICE — BLADE SAW W1.14XL2.17IN

## (undated) DEVICE — ENCORE® LATEX ACCLAIM SIZE 7, STERILE LATEX POWDER-FREE SURGICAL GLOVE: Brand: ENCORE

## (undated) DEVICE — DRAPE SHEET LARGE 76X55

## (undated) DEVICE — 3M™ STERI-STRIP™ REINFORCED ADHESIVE SKIN CLOSURES, R1547, 1/2 IN X 4 IN (12 MM X 100 MM), 6 STRIPS/ENVELOPE: Brand: 3M™ STERI-STRIP™

## (undated) DEVICE — SNAP KOVER: Brand: UNBRANDED

## (undated) DEVICE — WRAP COOLING SHLDR W/ICE PILLO

## (undated) DEVICE — SUT ETHIBOND 2 V-37 MX69G

## (undated) DEVICE — GAMMEX® PI HYBRID SIZE 9, STERILE POWDER-FREE SURGICAL GLOVE, POLYISOPRENE AND NEOPRENE BLEND: Brand: GAMMEX

## (undated) DEVICE — APPLICATOR CHLORAPREP 26ML

## (undated) DEVICE — SUT COAT VCRL 2-0 18IN CT-1 ABSRB UD CR 36MM

## (undated) DEVICE — GLOVE SUR 7 SENSICARE PI MIC PIP CRM PWD F

## (undated) DEVICE — WRAP COOLING BACK W/NO PILLOW

## (undated) DEVICE — TOWEL SURG OR 17X30IN BLUE

## (undated) DEVICE — PAD N ADH 3X4IN COT POLY SFT PERF FLM

## (undated) DEVICE — ANTIBACTERIAL UNDYED BRAIDED (POLYGLACTIN 910), SYNTHETIC ABSORBABLE SUTURE: Brand: COATED VICRYL

## (undated) DEVICE — COVER TBL W60XL90IN STD PUNC RESIST LO

## (undated) DEVICE — BAG DRNGE 2000ML URIN INF CTRL ANTIREFLX

## (undated) DEVICE — TUBING MEGADYNE SPECULUM

## (undated) DEVICE — COVER STND 54X23IN MAYO REINF

## (undated) DEVICE — SOL NACL IRRIG 0.9% 1000ML BTL

## (undated) DEVICE — UNDYED BRAIDED (POLYGLACTIN 910), SYNTHETIC ABSORBABLE SUTURE: Brand: COATED VICRYL

## (undated) DEVICE — GAUZE,SPONGE,4"X4",12PLY,WOVEN,NS,LF: Brand: MEDLINE

## (undated) DEVICE — DRAPE SRG 90X60IN BCK TBL CVR

## (undated) DEVICE — GLOVE SUR 8.5 SENSICARE PI MIC PIP CRM PWD F

## (undated) DEVICE — SHOULDER STABILIZATION KIT,                                    DISPOSABLE 12 PER BOX

## (undated) DEVICE — 3M™ STERI-DRAPE™ U-DRAPE 1015: Brand: STERI-DRAPE™

## (undated) DEVICE — SOLUTION  .9 3000ML

## (undated) DEVICE — SUT COAT VCRL 0 27IN CT-1 ABSRB VLT 36MM 1/2

## (undated) DEVICE — HOOD: Brand: FLYTE

## (undated) DEVICE — C-ARMOR C-ARM EQUIPMENT COVERS CLEAR STERILE UNIVERSAL FIT 12 PER CASE: Brand: C-ARMOR

## (undated) DEVICE — UROLOGY DRAIN BAG

## (undated) DEVICE — KIT HEMSTAT MTRX 8ML PORCINE GEL HUM THROM

## (undated) DEVICE — GOWN,SIRUS,FAB REINF,RAGLAN,L,STERILE: Brand: MEDLINE

## (undated) DEVICE — PACK CUSTOM CYSTO

## (undated) DEVICE — CATHETER URET 5FRX28IN CONE TIP POLYUR DISP

## (undated) DEVICE — GAMMEX® NON-LATEX PI ORTHO SIZE 9, STERILE POLYISOPRENE POWDER-FREE SURGICAL GLOVE: Brand: GAMMEX

## (undated) DEVICE — 3M(TM) TEGADERM(TM) TRANSPARENT FILM DRESSING FRAME STYLE 1628: Brand: 3M™ TEGADERM™

## (undated) DEVICE — NON-ADHERENT DRESSING: Brand: TELFA

## (undated) DEVICE — PENCIL ES BTTN SWCH W/ TIP HOLSTER E-Z CLN

## (undated) DEVICE — SPK10329 JACKSON KIT: Brand: SPK10329 JACKSON KIT

## (undated) DEVICE — CATHETER URETH 18FR BLLN 5CC SIL ALLY W/ SIL

## (undated) DEVICE — SPONGE PREMIERPRO 7X18X18

## (undated) DEVICE — SUT VCRL 2-0 27IN CT-1 ABSRB UD CR L36MM 1/2

## (undated) DEVICE — SPONGE,LAP,4"X18",XR,ST,5/PK,40PK/CS: Brand: MEDLINE INDUSTRIES, INC.

## (undated) DEVICE — SKIN PREP TRAY 4 COMPARTM TRAY: Brand: MEDLINE INDUSTRIES, INC.

## (undated) DEVICE — GAMMEX® PI HYBRID SIZE 8.5, STERILE POWDER-FREE SURGICAL GLOVE, POLYISOPRENE AND NEOPRENE BLEND: Brand: GAMMEX

## (undated) DEVICE — INTENDED USE FOR SURGICAL MARKING ON INTACT SKIN, ALSO PROVIDES A PERMANENT METHOD OF IDENTIFYING OBJECTS IN THE OPERATING ROOM: Brand: WRITESITE® PLUS MINI PREP RESISTANT MARKER

## (undated) DEVICE — SLEEVE COMPR MD KNEE LEN SGL USE KENDALL SCD

## (undated) DEVICE — T-MAX DISPOSABLE FACE MASK 8 PER BOX

## (undated) DEVICE — SUT MCRYL 3-0 18IN PS-2 ABSRB UD 19MM 3/8 CIR

## (undated) DEVICE — E-Z CLEAN, NON-STICK, PTFE COATED, ELECTROSURGICAL BLADE ELECTRODE, MODIFIED EXTENDED INSULATION, 6.5 INCH (16.5 CM): Brand: MEGADYNE

## (undated) DEVICE — SUT COAT VCRL+ 0 27IN UR-6 ABSRB VLT ANTIBACT

## (undated) NOTE — LETTER
[address], [place]  Authorization for Invasive Procedures  1.  I hereby authorize  Dr Gatito Jones  , my physician and whomever may be designated as the doctor's assistant, to perform the following operation and/or procedure: Cardiac Ca 5. I consent to the photographing of the operations or procedures to be performed for the purposes of advancing medicine, science, and/or education, provided my identity is not revealed.  If the procedure has been videotaped, the physician/surgeon will obta __________ Time: ___________    Statement of Physician  My signature below affirms that prior to the time of the procedure, I have explained to the patient and/or his legal representative, the risks and benefits involved in the proposed treatment and any r